# Patient Record
Sex: MALE | Race: WHITE | Employment: OTHER | URBAN - METROPOLITAN AREA
[De-identification: names, ages, dates, MRNs, and addresses within clinical notes are randomized per-mention and may not be internally consistent; named-entity substitution may affect disease eponyms.]

---

## 2022-09-11 ENCOUNTER — HOSPITAL ENCOUNTER (INPATIENT)
Age: 72
LOS: 3 days | Discharge: HOME HEALTH | DRG: 617 | End: 2022-09-14
Attending: STUDENT IN AN ORGANIZED HEALTH CARE EDUCATION/TRAINING PROGRAM | Admitting: HOSPITALIST
Payer: MEDICARE

## 2022-09-11 DIAGNOSIS — M86.9 OSTEOMYELITIS OF GREAT TOE OF RIGHT FOOT (HCC): Primary | ICD-10-CM

## 2022-09-11 PROBLEM — E11.9 CONTROLLED TYPE 2 DIABETES MELLITUS, WITH LONG-TERM CURRENT USE OF INSULIN (HCC): Status: ACTIVE | Noted: 2022-09-11

## 2022-09-11 PROBLEM — Z99.89 OSA ON CPAP: Status: ACTIVE | Noted: 2022-09-11

## 2022-09-11 PROBLEM — I48.91 A-FIB (HCC): Status: ACTIVE | Noted: 2022-09-11

## 2022-09-11 PROBLEM — Z79.4 CONTROLLED TYPE 2 DIABETES MELLITUS, WITH LONG-TERM CURRENT USE OF INSULIN (HCC): Status: ACTIVE | Noted: 2022-09-11

## 2022-09-11 PROBLEM — G47.33 OSA ON CPAP: Status: ACTIVE | Noted: 2022-09-11

## 2022-09-11 LAB
ALBUMIN SERPL-MCNC: 3.3 G/DL (ref 3.4–5)
ALBUMIN/GLOB SERPL: 0.9 {RATIO} (ref 0.8–1.7)
ALP SERPL-CCNC: 71 U/L (ref 45–117)
ALT SERPL-CCNC: 30 U/L (ref 16–61)
ANION GAP SERPL CALC-SCNC: 4 MMOL/L (ref 3–18)
AST SERPL-CCNC: 23 U/L (ref 10–38)
BASOPHILS # BLD: 0 K/UL (ref 0–0.1)
BASOPHILS NFR BLD: 1 % (ref 0–2)
BILIRUB SERPL-MCNC: 0.6 MG/DL (ref 0.2–1)
BUN SERPL-MCNC: 15 MG/DL (ref 7–18)
BUN/CREAT SERPL: 23 (ref 12–20)
CALCIUM SERPL-MCNC: 9 MG/DL (ref 8.5–10.1)
CHLORIDE SERPL-SCNC: 108 MMOL/L (ref 100–111)
CO2 SERPL-SCNC: 28 MMOL/L (ref 21–32)
CREAT SERPL-MCNC: 0.65 MG/DL (ref 0.6–1.3)
DIFFERENTIAL METHOD BLD: ABNORMAL
EOSINOPHIL # BLD: 0.2 K/UL (ref 0–0.4)
EOSINOPHIL NFR BLD: 4 % (ref 0–5)
ERYTHROCYTE [DISTWIDTH] IN BLOOD BY AUTOMATED COUNT: 14.5 % (ref 11.6–14.5)
EST. AVERAGE GLUCOSE BLD GHB EST-MCNC: 157 MG/DL
GLOBULIN SER CALC-MCNC: 3.7 G/DL (ref 2–4)
GLUCOSE BLD STRIP.AUTO-MCNC: 100 MG/DL (ref 70–110)
GLUCOSE BLD STRIP.AUTO-MCNC: 63 MG/DL (ref 70–110)
GLUCOSE BLD STRIP.AUTO-MCNC: 89 MG/DL (ref 70–110)
GLUCOSE SERPL-MCNC: 79 MG/DL (ref 74–99)
HBA1C MFR BLD: 7.1 % (ref 4.2–5.6)
HCT VFR BLD AUTO: 39.7 % (ref 36–48)
HGB BLD-MCNC: 12.9 G/DL (ref 13–16)
IMM GRANULOCYTES # BLD AUTO: 0 K/UL (ref 0–0.04)
IMM GRANULOCYTES NFR BLD AUTO: 1 % (ref 0–0.5)
INR PPP: 2.3 (ref 0.8–1.2)
LYMPHOCYTES # BLD: 0.9 K/UL (ref 0.9–3.6)
LYMPHOCYTES NFR BLD: 14 % (ref 21–52)
MCH RBC QN AUTO: 32.3 PG (ref 24–34)
MCHC RBC AUTO-ENTMCNC: 32.5 G/DL (ref 31–37)
MCV RBC AUTO: 99.3 FL (ref 78–100)
MONOCYTES # BLD: 0.8 K/UL (ref 0.05–1.2)
MONOCYTES NFR BLD: 13 % (ref 3–10)
NEUTS SEG # BLD: 4.5 K/UL (ref 1.8–8)
NEUTS SEG NFR BLD: 69 % (ref 40–73)
NRBC # BLD: 0 K/UL (ref 0–0.01)
NRBC BLD-RTO: 0 PER 100 WBC
PLATELET # BLD AUTO: 218 K/UL (ref 135–420)
PMV BLD AUTO: 9.1 FL (ref 9.2–11.8)
POTASSIUM SERPL-SCNC: 4.5 MMOL/L (ref 3.5–5.5)
PROT SERPL-MCNC: 7 G/DL (ref 6.4–8.2)
PROTHROMBIN TIME: 25.5 SEC (ref 11.5–15.2)
RBC # BLD AUTO: 4 M/UL (ref 4.35–5.65)
SODIUM SERPL-SCNC: 140 MMOL/L (ref 136–145)
WBC # BLD AUTO: 6.5 K/UL (ref 4.6–13.2)

## 2022-09-11 PROCEDURE — 87040 BLOOD CULTURE FOR BACTERIA: CPT

## 2022-09-11 PROCEDURE — 85025 COMPLETE CBC W/AUTO DIFF WBC: CPT

## 2022-09-11 PROCEDURE — 74011250637 HC RX REV CODE- 250/637: Performed by: HOSPITALIST

## 2022-09-11 PROCEDURE — 85610 PROTHROMBIN TIME: CPT

## 2022-09-11 PROCEDURE — 74011000258 HC RX REV CODE- 258: Performed by: PHYSICIAN ASSISTANT

## 2022-09-11 PROCEDURE — 74011250636 HC RX REV CODE- 250/636: Performed by: PHYSICIAN ASSISTANT

## 2022-09-11 PROCEDURE — 83036 HEMOGLOBIN GLYCOSYLATED A1C: CPT

## 2022-09-11 PROCEDURE — 96374 THER/PROPH/DIAG INJ IV PUSH: CPT

## 2022-09-11 PROCEDURE — 65270000029 HC RM PRIVATE

## 2022-09-11 PROCEDURE — 80053 COMPREHEN METABOLIC PANEL: CPT

## 2022-09-11 PROCEDURE — 82962 GLUCOSE BLOOD TEST: CPT

## 2022-09-11 PROCEDURE — 93005 ELECTROCARDIOGRAM TRACING: CPT

## 2022-09-11 PROCEDURE — 99285 EMERGENCY DEPT VISIT HI MDM: CPT

## 2022-09-11 PROCEDURE — 74011000250 HC RX REV CODE- 250: Performed by: HOSPITALIST

## 2022-09-11 RX ORDER — OXYCODONE AND ACETAMINOPHEN 5; 325 MG/1; MG/1
1 TABLET ORAL
Status: DISCONTINUED | OUTPATIENT
Start: 2022-09-11 | End: 2022-09-14 | Stop reason: HOSPADM

## 2022-09-11 RX ORDER — MONTELUKAST SODIUM 10 MG/1
10 TABLET ORAL
COMMUNITY

## 2022-09-11 RX ORDER — SPIRONOLACTONE 25 MG/1
25 TABLET ORAL DAILY
Status: DISCONTINUED | OUTPATIENT
Start: 2022-09-12 | End: 2022-09-11

## 2022-09-11 RX ORDER — INSULIN GLARGINE 100 [IU]/ML
50 INJECTION, SOLUTION SUBCUTANEOUS
Status: DISCONTINUED | OUTPATIENT
Start: 2022-09-11 | End: 2022-09-13

## 2022-09-11 RX ORDER — SPIRONOLACTONE 25 MG/1
12.5 TABLET ORAL DAILY
Status: DISCONTINUED | OUTPATIENT
Start: 2022-09-12 | End: 2022-09-14 | Stop reason: HOSPADM

## 2022-09-11 RX ORDER — IPRATROPIUM BROMIDE 21 UG/1
2 SPRAY, METERED NASAL 3 TIMES DAILY
COMMUNITY

## 2022-09-11 RX ORDER — ACETAMINOPHEN 325 MG/1
650 TABLET ORAL
Status: DISCONTINUED | OUTPATIENT
Start: 2022-09-11 | End: 2022-09-14 | Stop reason: HOSPADM

## 2022-09-11 RX ORDER — ATORVASTATIN CALCIUM 40 MG/1
40 TABLET, FILM COATED ORAL
COMMUNITY

## 2022-09-11 RX ORDER — SPIRONOLACTONE 25 MG/1
25 TABLET ORAL DAILY
COMMUNITY

## 2022-09-11 RX ORDER — MONTELUKAST SODIUM 10 MG/1
10 TABLET ORAL
Status: DISCONTINUED | OUTPATIENT
Start: 2022-09-11 | End: 2022-09-14 | Stop reason: HOSPADM

## 2022-09-11 RX ORDER — MAGNESIUM SULFATE 100 %
4 CRYSTALS MISCELLANEOUS AS NEEDED
Status: DISCONTINUED | OUTPATIENT
Start: 2022-09-11 | End: 2022-09-14 | Stop reason: HOSPADM

## 2022-09-11 RX ORDER — VANCOMYCIN 2 GRAM/500 ML IN 0.9 % SODIUM CHLORIDE INTRAVENOUS
2000 ONCE
Status: COMPLETED | OUTPATIENT
Start: 2022-09-11 | End: 2022-09-12

## 2022-09-11 RX ORDER — ONDANSETRON 2 MG/ML
4 INJECTION INTRAMUSCULAR; INTRAVENOUS
Status: DISCONTINUED | OUTPATIENT
Start: 2022-09-11 | End: 2022-09-14 | Stop reason: HOSPADM

## 2022-09-11 RX ORDER — SODIUM CHLORIDE 0.9 % (FLUSH) 0.9 %
5-40 SYRINGE (ML) INJECTION AS NEEDED
Status: DISCONTINUED | OUTPATIENT
Start: 2022-09-11 | End: 2022-09-14 | Stop reason: HOSPADM

## 2022-09-11 RX ORDER — ATORVASTATIN CALCIUM 20 MG/1
40 TABLET, FILM COATED ORAL
Status: DISCONTINUED | OUTPATIENT
Start: 2022-09-11 | End: 2022-09-14 | Stop reason: HOSPADM

## 2022-09-11 RX ORDER — DEXTROSE MONOHYDRATE 100 MG/ML
0-250 INJECTION, SOLUTION INTRAVENOUS AS NEEDED
Status: DISCONTINUED | OUTPATIENT
Start: 2022-09-11 | End: 2022-09-14 | Stop reason: HOSPADM

## 2022-09-11 RX ORDER — VANCOMYCIN HYDROCHLORIDE
1250 EVERY 12 HOURS
Status: DISCONTINUED | OUTPATIENT
Start: 2022-09-12 | End: 2022-09-13

## 2022-09-11 RX ORDER — AMOXICILLIN AND CLAVULANATE POTASSIUM 875; 125 MG/1; MG/1
TABLET, FILM COATED ORAL 2 TIMES DAILY
COMMUNITY
End: 2022-09-14

## 2022-09-11 RX ORDER — SODIUM CHLORIDE 0.9 % (FLUSH) 0.9 %
5-40 SYRINGE (ML) INJECTION EVERY 8 HOURS
Status: DISCONTINUED | OUTPATIENT
Start: 2022-09-11 | End: 2022-09-14 | Stop reason: HOSPADM

## 2022-09-11 RX ORDER — WARFARIN SODIUM 5 MG/1
5 TABLET ORAL DAILY
COMMUNITY

## 2022-09-11 RX ORDER — PROMETHAZINE HYDROCHLORIDE 25 MG/1
12.5 TABLET ORAL
Status: DISCONTINUED | OUTPATIENT
Start: 2022-09-11 | End: 2022-09-14 | Stop reason: HOSPADM

## 2022-09-11 RX ORDER — LOSARTAN POTASSIUM 50 MG/1
50 TABLET ORAL DAILY
COMMUNITY

## 2022-09-11 RX ORDER — ACETAMINOPHEN 650 MG/1
650 SUPPOSITORY RECTAL
Status: DISCONTINUED | OUTPATIENT
Start: 2022-09-11 | End: 2022-09-14 | Stop reason: HOSPADM

## 2022-09-11 RX ORDER — FACIAL-BODY WIPES
10 EACH TOPICAL DAILY PRN
Status: DISCONTINUED | OUTPATIENT
Start: 2022-09-11 | End: 2022-09-14 | Stop reason: HOSPADM

## 2022-09-11 RX ORDER — INSULIN LISPRO 100 [IU]/ML
INJECTION, SOLUTION INTRAVENOUS; SUBCUTANEOUS
Status: DISCONTINUED | OUTPATIENT
Start: 2022-09-11 | End: 2022-09-14 | Stop reason: HOSPADM

## 2022-09-11 RX ORDER — MELOXICAM 7.5 MG/1
7.5 TABLET ORAL
COMMUNITY
End: 2022-09-14

## 2022-09-11 RX ORDER — METFORMIN HYDROCHLORIDE 750 MG/1
750 TABLET, EXTENDED RELEASE ORAL
COMMUNITY

## 2022-09-11 RX ORDER — LOSARTAN POTASSIUM 50 MG/1
50 TABLET ORAL DAILY
Status: DISCONTINUED | OUTPATIENT
Start: 2022-09-12 | End: 2022-09-14 | Stop reason: HOSPADM

## 2022-09-11 RX ORDER — VANCOMYCIN HYDROCHLORIDE
1250 EVERY 24 HOURS
Status: DISCONTINUED | OUTPATIENT
Start: 2022-09-12 | End: 2022-09-11

## 2022-09-11 RX ADMIN — CEFEPIME 2 G: 2 INJECTION, POWDER, FOR SOLUTION INTRAVENOUS at 22:30

## 2022-09-11 RX ADMIN — MONTELUKAST 10 MG: 10 TABLET, FILM COATED ORAL at 19:59

## 2022-09-11 RX ADMIN — VANCOMYCIN HYDROCHLORIDE 2000 MG: 10 INJECTION, POWDER, LYOPHILIZED, FOR SOLUTION INTRAVENOUS at 17:23

## 2022-09-11 RX ADMIN — CEFEPIME 2 G: 2 INJECTION, POWDER, FOR SOLUTION INTRAVENOUS at 14:05

## 2022-09-11 RX ADMIN — SODIUM CHLORIDE, PRESERVATIVE FREE 10 ML: 5 INJECTION INTRAVENOUS at 17:24

## 2022-09-11 RX ADMIN — ATORVASTATIN CALCIUM 40 MG: 20 TABLET, FILM COATED ORAL at 19:59

## 2022-09-11 NOTE — H&P
History & Physical    Patient: Doug Ascencio MRN: [id number]  CSN: [id number]    YOB: 1950  Age: 67 y.o. Sex: male      DOA: 9/11/2022  Primary Care Provider:  Ananya Olivas DO      Assessment/Plan     Hospital Problems  Never Reviewed            Codes Class Noted POA    * (Principal) Osteomyelitis of great toe of right foot ([facility] [address] ICD-10-CM: M86.9  ICD-9-CM: 730.27  9/11/2022 Unknown        A-fib ([facility] [address] ICD-10-CM: I48.91  ICD-9-CM: 427.31  9/11/2022 Unknown        Controlled type 2 diabetes mellitus, with long-term current use of insulin (HCC) ICD-10-CM: E11.9, Z79.4  ICD-9-CM: 250.00, V58.67  9/11/2022 Unknown        KOLE on CPAP ICD-10-CM: G47.33, Z99.89  ICD-9-CM: 327.23, V46.8  9/11/2022 Unknown             Admit to remote telemetry    Osteomyelitis of left toe  Local wound care  Case discussed with Dr. Kyree Mcfarlane planning to do surgery tomorrow  Will have wound culture  ID consult  Continue IV antibiotics    A. fib on warfarin  Continue monitoring,   Cardiology consult for clearance. EKG, troponin, echo  Discussed with Dr. Anastasia Galeazzi warfarin for now for the procedure  Daily INR    KOLE  On CPAP we will continue    Hypertension  Continue home medication      DM type II , with complication,  Put Lantus, 50 use 7030 60/70 twice a day,  Ssi also         Full code     Please note that this dictation was completed with [employer], the [employer] voice recognition software. Quite often unanticipated grammatical, syntax, homophones, and other interpretive errors are inadvertently transcribed by the computer software. Please disregard these errors. Please excuse any errors that have escaped final proofreading    Estimate  length of stay : 2-3 day    DVT : heparin ppi proph  CC: foot pain        HPI:     Doug Ascencio is a 67 y.o. male with hypertension, A. fib on warfarin,  DM, kole on cpap was sent to ER per Dr. Kyree Mcfarlane podiatrist due to left toe OM. He has left toe lesion for 2 to 3 weeks.   He was giving p.o. antibiotics, his foot swelling did not got improving. He was sent to see Dr. Jersey Mendenhall. He got  recent MRI done last Friday, he was called to go to ER for further evaluation and treatment because of osteomyelitis of the toe from MRI report. Denies any slurred speech/headache/cp/n/v/blurred vission/d/c/palpitation/gait change/bleeding. Denies smoking/ any alcohol or drug use. He uses cpap at night       Visit Vitals  BP (!) 147/80   Pulse 78   Temp 98.6 °F (37 °C)   Resp 18   Ht 5' 8\" (1.727 m)   Wt 122.5 kg (270 lb)   SpO2 98%   BMI 41.05 kg/m²         Past medical history  Hypertension    Surgical social history  None  Family history  Hypertension diabetes parents    Social History     Socioeconomic History    Marital status:        Prior to Admission medications    Medication Sig Start Date End Date Taking? Authorizing Provider   amoxicillin-clavulanate (AUGMENTIN) 875-125 mg per tablet Take  by mouth two (2) times a day. Yes Provider, Historical   atorvastatin (LIPITOR) 40 mg tablet Take 40 mg by mouth daily (after dinner). Yes Provider, Historical   ipratropium (ATROVENT) 21 mcg (0.03 %) nasal spray 2 Sprays three (3) times daily. Yes Provider, Historical   losartan (COZAAR) 50 mg tablet Take 50 mg by mouth daily. Yes Provider, Historical   meloxicam (MOBIC) 7.5 mg tablet Take 7.5 mg by mouth daily as needed for Pain. For pain   Yes Provider, Historical   metFORMIN ER (GLUCOPHAGE XR) 750 mg tablet Take 750 mg by mouth Daily (before dinner). Two tablets   Yes Provider, Historical   montelukast (SINGULAIR) 10 mg tablet Take 10 mg by mouth daily (after dinner). Yes Provider, Historical   spironolactone (ALDACTONE) 25 mg tablet Take 25 mg by mouth daily. Take one half tablet once daily   Yes Provider, Historical   warfarin (COUMADIN) 5 mg tablet Take 5 mg by mouth daily.  Take one and 1/2 tablets every day per INR   Yes Provider, Historical       Allergies   Allergen Reactions    Sulfa (Sulfonamide Antibiotics) Unknown (comments)       Review of Systems  Gen: No fever, chills, malaise, weight loss/gain. Heent: No headache, rhinorrhea, epistaxis, ear pain, hearing loss, sinus pain, neck pain/stiffness, sore throat. Heart: No chest pain, palpitations, OCHOA, pnd, or orthopnea. Resp: No cough, hemoptysis, wheezing and shortness of breath. GI: No nausea, vomiting, diarrhea, constipation, melena or hematochezia. : No urinary obstruction, dysuria or hematuria. Derm: No rash, new skin lesion or pruritis. Musc/skeletal: pain in toes   Vasc: No edema, cyanosis or claudication. Endo: No heat/cold intolerance, no polyuria,polydipsia or polyphagia. Neuro: No unilateral weakness, numbness, tingling. No seizures. Heme: No easy bruising or bleeding. Physical Exam:     Physical Exam:  Visit Vitals  BP (!) 147/80   Pulse 78   Temp 98.6 °F (37 °C)   Resp 18   Ht 5' 8\" (1.727 m)   Wt 122.5 kg (270 lb)   SpO2 98%   BMI 41.05 kg/m²           Temp (24hrs), Av.2 °F (36.8 °C), Min:97.8 °F (36.6 °C), Max:98.6 °F (37 °C)    No intake/output data recorded. No intake/output data recorded. General:  Awake, cooperative, no distress. Head:  Normocephalic, without obvious abnormality, atraumatic. Eyes:  Conjunctivae/corneas clear, sclera anicteric, PERRL, EOMs intact. Nose: Nares normal. No drainage or sinus tenderness. Throat: Lips, mucosa, and tongue normal. .   Neck: Supple, symmetrical, trachea midline, no adenopathy. Lungs:   Clear to auscultation bilaterally. Heart:  Regular rate and rhythm, S1, S2 normal, no murmur, click, rub or gallop. Abdomen: Soft, non-tender. Bowel sounds normal. No masses,  No organomegaly. Extremities: Extremities normal, see the picture, no cyanosis or edema. Pulses: 2+ and symmetric all extremities. Skin: Skin color-pink, texture, turgor normal. See below  Capillary refill normal    Neurologic: CNII-XII intact.  No focal motor or sensor      y deficit. Labs Reviewed:  Recent Results (from the past 12 hour(s))   CBC WITH AUTOMATED DIFF    Collection Time: 09/11/22  1:45 PM   Result Value Ref Range    WBC 6.5 4.6 - 13.2 K/uL    RBC 4.00 (L) 4.35 - 5.65 M/uL    HGB 12.9 (L) 13.0 - 16.0 g/dL    HCT 39.7 36.0 - 48.0 %    MCV 99.3 78.0 - 100.0 FL    MCH 32.3 24.0 - 34.0 PG    MCHC 32.5 31.0 - 37.0 g/dL    RDW 14.5 11.6 - 14.5 %    PLATELET 478 074 - 825 K/uL    MPV 9.1 (L) 9.2 - 11.8 FL    NRBC 0.0 0  WBC    ABSOLUTE NRBC 0.00 0.00 - 0.01 K/uL    NEUTROPHILS 69 40 - 73 %    LYMPHOCYTES 14 (L) 21 - 52 %    MONOCYTES 13 (H) 3 - 10 %    EOSINOPHILS 4 0 - 5 %    BASOPHILS 1 0 - 2 %    IMMATURE GRANULOCYTES 1 (H) 0.0 - 0.5 %    ABS. NEUTROPHILS 4.5 1.8 - 8.0 K/UL    ABS. LYMPHOCYTES 0.9 0.9 - 3.6 K/UL    ABS. MONOCYTES 0.8 0.05 - 1.2 K/UL    ABS. EOSINOPHILS 0.2 0.0 - 0.4 K/UL    ABS. BASOPHILS 0.0 0.0 - 0.1 K/UL    ABS. IMM. GRANS. 0.0 0.00 - 0.04 K/UL    DF AUTOMATED     METABOLIC PANEL, COMPREHENSIVE    Collection Time: 09/11/22  1:45 PM   Result Value Ref Range    Sodium 140 136 - 145 mmol/L    Potassium 4.5 3.5 - 5.5 mmol/L    Chloride 108 100 - 111 mmol/L    CO2 28 21 - 32 mmol/L    Anion gap 4 3.0 - 18 mmol/L    Glucose 79 74 - 99 mg/dL    BUN 15 7.0 - 18 MG/DL    Creatinine 0.65 0.6 - 1.3 MG/DL    BUN/Creatinine ratio 23 (H) 12 - 20      GFR est AA >60 >60 ml/min/1.73m2    GFR est non-AA >60 >60 ml/min/1.73m2    Calcium 9.0 8.5 - 10.1 MG/DL    Bilirubin, total 0.6 0.2 - 1.0 MG/DL    ALT (SGPT) 30 16 - 61 U/L    AST (SGOT) 23 10 - 38 U/L    Alk.  phosphatase 71 45 - 117 U/L    Protein, total 7.0 6.4 - 8.2 g/dL    Albumin 3.3 (L) 3.4 - 5.0 g/dL    Globulin 3.7 2.0 - 4.0 g/dL    A-G Ratio 0.9 0.8 - 1.7     PROTHROMBIN TIME + INR    Collection Time: 09/11/22  1:45 PM   Result Value Ref Range    Prothrombin time 25.5 (H) 11.5 - 15.2 sec    INR 2.3 (H) 0.8 - 1.2     GLUCOSE, POC    Collection Time: 09/11/22  3:39 PM   Result Value Ref Range    Glucose (POC) 63 (L) 70 - 110 mg/dL   GLUCOSE, POC    Collection Time: 09/11/22  4:25 PM   Result Value Ref Range    Glucose (POC) 89 70 - 110 mg/dL       No results found.   Procedures/imaging: see electronic medical records for all procedures/Xrays and details which were not copied into this note but were reviewed prior to creation of Shubham Byrd MD, Internal Medicine     CC: Clint Mansfield DO

## 2022-09-11 NOTE — ROUTINE PROCESS
TRANSFER - OUT REPORT:    Verbal report given to medical(name) on Aby Ceballos  being transferred to [facility] medical(unit) for routine progression of care       Report consisted of patients Situation, Background, Assessment and   Recommendations(SBAR). Information from the following report(s) SBAR and ED Summary was reviewed with the receiving nurse. Lines:   Peripheral IV 09/11/22 Right Forearm (Active)   Site Assessment Clean, dry, & intact 09/11/22 1340   Hub Color/Line Status Pink 09/11/22 1340        Opportunity for questions and clarification was provided.       Patient transported with:   [employer]

## 2022-09-11 NOTE — ED TRIAGE NOTES
Pt presents from Dr. Severiano Rowland for R great toe infection. Denies fever, chills, NVD.  Currently taking Augmentin, finished Bactrim

## 2022-09-11 NOTE — Clinical Note
Status[de-identified] INPATIENT [101]   Type of Bed: Medical [8]   Cardiac Monitoring Required?: No   Inpatient Hospitalization Certified Necessary for the Following Reasons: 3.  Patient receiving treatment that can only be provided in an inpatient setting (further clarification in H&P documentation)   Admitting Diagnosis: Osteomyelitis of great toe of right foot [facility]) [[id number]]   Admitting Physician: Anyi Villaseñor [[id number]]   Attending Physician: Anyi Villaseñor [[id number]]   Estimated Length of Stay: 2 Midnights   Discharge Plan[de-identified] Home with Office Follow-up

## 2022-09-11 NOTE — PROGRESS NOTES
[address] [facility] Pharmacokinetic Monitoring Service - Vancomycin     Sapna Cuevas is a 67 y.o. male starting on vancomycin therapy for Bone and Joint infection. Pharmacy consulted by Michael SIMS  for monitoring and adjustment. Target Concentration: Goal AUC/DEDE 400-600 mg*hr/L    Additional Antimicrobials: Cefepime     Pertinent Laboratory Values: Wt Readings from Last 1 Encounters:   09/11/22 122.5 kg (270 lb)     Temp Readings from Last 1 Encounters:   09/11/22 97.8 °F (36.6 °C)     No components found for: PROCAL  Estimated Creatinine Clearance: 121.4 mL/min (by C-G formula based on SCr of 0.65 mg/dL).   Recent Labs     09/11/22  1345   WBC 6.5     Plan:  Dosing recommendations based on Bayesian software  Start Vancomycin 2 gm IVPB x 1 LD then Vancomycin 1250 mg IVPB q12h   Anticipated AUC of 513 mg/l.h and trough concentration of 13.9 mg/l at steady state  Renal labs as indicated   Pharmacy will continue to monitor patient and adjust therapy as indicated    MIGUEL ANGEL Arizmendi [facility], BCPS   9/11/2022 2:51 PM

## 2022-09-11 NOTE — ED PROVIDER NOTES
EMERGENCY DEPARTMENT HISTORY AND PHYSICAL EXAM    Date: 9/11/2022  Patient Name: Eyd Lopes    History of Presenting Illness     Chief Complaint   Patient presents with    Toe Pain         History Provided By: Patient    2:05 PM  Shruti Chaudhari is a 67 y.o. male with PMHX of hypertension, diabetes, atrial fibrillation on Coumadin, who presents to the emergency department C/O osteomyelitis of right great toe. Patient states he developed right great toe pain redness and swelling a few weeks ago, saw urgent care was started on antibiotic. He then saw his PCP as it worsened who immediately sent him to a podiatrist Dr. Oniel Acosta. He states he is taking 2 unknown antibiotics currently, had outpatient MRI of the right foot 2 days ago and was called by the podiatrist yesterday to go to the ER for admission as he had evidence of osteomyelitis of his right great toe. Pt denies fever, injury, and any other sxs or complaints. PCP: Flash Hernandez DO    Current Facility-Administered Medications   Medication Dose Route Frequency Provider Last Rate Last Admin    cefepime (MAXIPIME) 2 g in 0.9% sodium chloride (MBP/ADV) 100 mL MBP  2 g IntraVENous Q8H LEVI Rodriguez 200 mL/hr at 09/11/22 1405 2 g at 09/11/22 1405    vancomycin (VANCOCIN) 2000 mg in  ml infusion  2,000 mg IntraVENous ONCE LEVI Rodriguez           Past History     Past Medical History:  No past medical history on file. Past Surgical History:  No past surgical history on file. Family History:  No family history on file. Social History: Allergies: Allergies   Allergen Reactions    Sulfa (Sulfonamide Antibiotics) Unknown (comments)         Review of Systems   Review of Systems   Constitutional:  Negative for fever. Musculoskeletal:  Positive for arthralgias, joint swelling and myalgias. Skin:  Positive for color change. Neurological:  Negative for numbness. All other systems reviewed and are negative.       Physical Exam Vitals:    09/11/22 1219   BP: (!) 146/85   Pulse: 65   Resp: 15   Temp: 97.8 °F (36.6 °C)   SpO2: 97%   Weight: 122.5 kg (270 lb)   Height: 5' 8\" (1.727 m)     Physical Exam  Vital signs and nursing notes reviewed. CONSTITUTIONAL: Alert. Well-appearing; obese, in no apparent distress. HEAD: Normocephalic; atraumatic. CV: Normal S1, S2; no murmurs, rubs, or gallops. RESPIRATORY: Normal chest excursion with respiration; breath sounds clear and equal bilaterally; no wheezes, rhonchi, or rales. EXT: RLE: Right great toe is diffusely swollen with circumferential erythema to the base of the toe, superficial ulceration to the medial aspect of his distal great toe and a small superficial laceration to the base of his toe that had scant bleeding that was easily controlled with pressure. Forefoot is slightly swollen as well. Cap refill less than 2 seconds. 2+ DP pulse. SKIN: Normal for age and race; warm; dry; good turgor; no apparent lesions or exudate. NEURO: A & O x3. PSYCH:  Mood and affect appropriate. Diagnostic Study Results     Labs -     Recent Results (from the past 12 hour(s))   CBC WITH AUTOMATED DIFF    Collection Time: 09/11/22  1:45 PM   Result Value Ref Range    WBC 6.5 4.6 - 13.2 K/uL    RBC 4.00 (L) 4.35 - 5.65 M/uL    HGB 12.9 (L) 13.0 - 16.0 g/dL    HCT 39.7 36.0 - 48.0 %    MCV 99.3 78.0 - 100.0 FL    MCH 32.3 24.0 - 34.0 PG    MCHC 32.5 31.0 - 37.0 g/dL    RDW 14.5 11.6 - 14.5 %    PLATELET 490 447 - 818 K/uL    MPV 9.1 (L) 9.2 - 11.8 FL    NRBC 0.0 0  WBC    ABSOLUTE NRBC 0.00 0.00 - 0.01 K/uL    NEUTROPHILS 69 40 - 73 %    LYMPHOCYTES 14 (L) 21 - 52 %    MONOCYTES 13 (H) 3 - 10 %    EOSINOPHILS 4 0 - 5 %    BASOPHILS 1 0 - 2 %    IMMATURE GRANULOCYTES 1 (H) 0.0 - 0.5 %    ABS. NEUTROPHILS 4.5 1.8 - 8.0 K/UL    ABS. LYMPHOCYTES 0.9 0.9 - 3.6 K/UL    ABS. MONOCYTES 0.8 0.05 - 1.2 K/UL    ABS. EOSINOPHILS 0.2 0.0 - 0.4 K/UL    ABS.  BASOPHILS 0.0 0.0 - 0.1 K/UL ABS. IMM. GRANS. 0.0 0.00 - 0.04 K/UL    DF AUTOMATED     PROTHROMBIN TIME + INR    Collection Time: 09/11/22  1:45 PM   Result Value Ref Range    Prothrombin time 25.5 (H) 11.5 - 15.2 sec    INR 2.3 (H) 0.8 - 1.2         Radiologic Studies -   No orders to display     CT Results  (Last 48 hours)      None          CXR Results  (Last 48 hours)      None            Medications given in the ED-  Medications   cefepime (MAXIPIME) 2 g in 0.9% sodium chloride (MBP/ADV) 100 mL MBP (2 g IntraVENous New Bag 9/11/22 1405)   vancomycin (VANCOCIN) 2000 mg in  ml infusion (has no administration in time range)         Medical Decision Making   I am the first provider for this patient. I reviewed the vital signs, available nursing notes, past medical history, past surgical history, family history and social history. Vital Signs-Reviewed the patient's vital signs. Records Reviewed: Nursing Notes      Procedures:  Procedures    ED Course:  2:05 PM   Initial assessment performed. The patients presenting problems have been discussed, and they are in agreement with the care plan formulated and outlined with them. I have encouraged them to ask questions as they arise throughout their visit. 2:15 PM consult note  Case discussed with hospitalist Dr. Marko Dennis who will admit to medical floor. Provider Notes (Medical Decision Making): Jacquelyn Lyon is a 67 y.o. male with hypertension, A. fib on Coumadin and diabetes presents sent by podiatrist for osteomyelitis of the right great toe on outpatient MRI 2 days ago. He has obvious cellulitis and a superficial open wound to his right great toe that will require admission for IV antibiotics. Blood cultures collected, vancomycin and cefepime started. 2:36 PM consult note  Case discussed with podiatrist on-call for Dr. Moose Graham, Dr. Radha Yan.   He agrees with admission and IV antibiotics but requests that the hospitalist keep patient n.p.o. after midnight and call Dr. José Miguel Chapin tomorrow for further management. Diagnosis and Disposition     ADMISSION NOTE:  2:09 PM  Patient is being admitted to the hospital by Dr. Christiano Manzano, . The results of their tests and reasons for their admission have been discussed with them and/or available family. They convey agreement and understanding for the need to be admitted and for their admission diagnosis. CONDITIONS ON ADMISSION:  Deep Vein Thrombosis is not present at the time of admission. Thrombosis is not present at the time of admission. Urinary Tract Infection is not present at the time of admission. Pneumonia is not present at the time of admission. MRSA possiblet the time of admission. Wound infection is present at the time of admission. Pressure Ulcer is not present at the time of admission. CLINICAL IMPRESSION:    1. Osteomyelitis of great toe of right foot ([address]        PLAN:  1. Admit        _______________________________      Please note that this dictation was completed with [employer], the computer voice recognition software. Quite often unanticipated grammatical, syntax, homophones, and other interpretive errors are inadvertently transcribed by the computer software. Please disregard these errors. Please excuse any errors that have escaped final proofreading.

## 2022-09-11 NOTE — CONSULTS
[facility] Consult Note      Patient: Anna Hui MRN: [id number]  SSN: [id number]    YOB: 1950  Age: 67 y.o. Sex: male    Date of Consultation: 9/11/2022  Referring Physician: Dr. Demetri Ling  Reason for Consultation: Atrial fibrillation and preoperative cardiac clearance foot surgery. HPI:  I was asked by Dr. Demetri Ling  To see this patient for preoperative cardiac clearance for foot surgery and chronic atrial fibrillation. Anna Parent is a 77-year-old gentleman with history of chronic atrial fibrillation on anticoagulation with the warfarin and on rate control medication with carvedilol, Aldactone and S losartan. Patient also have history of diabetes mellitus and hypertension and obesity. Patient denied any history of DVT, pulmonary embolism TIA or stroke. Patient is mainly admitted in the hospital with right foot pain and being scheduled for possible podiatry surgery          No past medical history on file. No past surgical history on file.   Current Facility-Administered Medications   Medication Dose Route Frequency    cefepime (MAXIPIME) 2 g in 0.9% sodium chloride (MBP/ADV) 100 mL MBP  2 g IntraVENous Q8H    vancomycin (VANCOCIN) 2000 mg in  ml infusion  2,000 mg IntraVENous ONCE    Vancomycin Pharmacy Dosing   1 Each Other Rx Dosing/Monitoring    sodium chloride (NS) flush 5-40 mL  5-40 mL IntraVENous Q8H    sodium chloride (NS) flush 5-40 mL  5-40 mL IntraVENous PRN    acetaminophen (TYLENOL) tablet 650 mg  650 mg Oral Q6H PRN    Or    acetaminophen (TYLENOL) suppository 650 mg  650 mg Rectal Q6H PRN    bisacodyL (DULCOLAX) suppository 10 mg  10 mg Rectal DAILY PRN    promethazine (PHENERGAN) tablet 12.5 mg  12.5 mg Oral Q6H PRN    Or    ondansetron (ZOFRAN) injection 4 mg  4 mg IntraVENous Q6H PRN    insulin lispro (HUMALOG) injection   SubCUTAneous AC&HS    glucose chewable tablet 16 g  4 Tablet Oral PRN    glucagon (GLUCAGEN) injection 1 mg  1 mg IntraMUSCular PRN    dextrose 10% infusion 0-250 mL  0-250 mL IntraVENous PRN    insulin glargine (LANTUS) injection 50 Units  50 Units SubCUTAneous QHS    atorvastatin (LIPITOR) tablet 40 mg  40 mg Oral PCD    [START ON 9/12/2022] losartan (COZAAR) tablet 50 mg  50 mg Oral DAILY    montelukast (SINGULAIR) tablet 10 mg  10 mg Oral PCD    [START ON 9/12/2022] spironolactone (ALDACTONE) tablet 12.5 mg  12.5 mg Oral DAILY    [START ON 9/12/2022] vancomycin (VANCOCIN) 1250 mg in  ml infusion  1,250 mg IntraVENous Q12H       Allergies and Intolerances: Allergies   Allergen Reactions    Sulfa (Sulfonamide Antibiotics) Unknown (comments)       Family History:   No family history on file. Social History:   He  has no history on file for tobacco use. He  has no history on file for alcohol use. Review of Systems:     Review of Systems  Gen: No fever, chills, malaise, weight loss/gain. Heent: No headache, rhinorrhea, epistaxis, ear pain, hearing loss, sinus pain, neck pain/stiffness, sore throat. Heart: No chest pain, palpitations, OCHOA, pnd, or orthopnea. Resp: No cough, hemoptysis, wheezing and shortness of breath. GI: No nausea, vomiting, diarrhea, constipation, melena or hematochezia. : No urinary obstruction, dysuria or hematuria. Derm: No rash, new skin lesion or pruritis. Musc/skeletal: no bone or joint complains. Right toe pain  Vasc: No edema, cyanosis or claudication. Endo: No heat/cold intolerance, no polyuria,polydipsia or polyphagia. Neuro: No unilateral weakness, numbness, tingling. No seizures. Heme: No easy bruising or bleeding. Physical:   Patient Vitals for the past 6 hrs:   Temp Pulse Resp BP SpO2   09/11/22 1626 -- -- -- (!) 147/80 --   09/11/22 1507 98.6 °F (37 °C) 78 18 (!) 151/86 98 %         Exam:   General Appearance: Comfortable, not using accessory muscles of respiration. HEENT: REEMA. HEAD: Atraumatic  NECK: No JVD, no thyroidomeglay. LUNGS: Clear bilaterally.    HEART: S1 irregular, variable +S2     ABD: Non-tender, BS Audible    EXT: No edema, and no cysnosis. VASCULAR EXAM:  pulses are intact in the upper extremities    PSYCHIATRIC EXAM: Mood is appropriate. MUSCULOSKELETAL: Grossly no joint deformity. NEUROLOGICAL: alert, oriented in time place and person    Review of Data:   LABS:   Lab Results   Component Value Date/Time    WBC 6.5 09/11/2022 01:45 PM    HGB 12.9 (L) 09/11/2022 01:45 PM    HCT 39.7 09/11/2022 01:45 PM    PLATELET 832 58/54/6593 01:45 PM     Lab Results   Component Value Date/Time    Sodium 140 09/11/2022 01:45 PM    Potassium 4.5 09/11/2022 01:45 PM    Chloride 108 09/11/2022 01:45 PM    CO2 28 09/11/2022 01:45 PM    Glucose 79 09/11/2022 01:45 PM    BUN 15 09/11/2022 01:45 PM    Creatinine 0.65 09/11/2022 01:45 PM     No results found for: CHOL, CHOLX, CHLST, CHOLV, HDL, HDLP, LDL, LDLC, DLDLP, TGLX, TRIGL, TRIGP  No components found for: GPT  No results found for: HBA1C, QRI0DWBF, RKL9QWER    RADIOLOGY:  CT Results  (Last 48 hours)      None          CXR Results  (Last 48 hours)      None              Cardiology Procedures: No results found for this or any previous visit. Echo Results  (Last 48 hours)      None         Cardiolite (Tc-99m Sestamibi) stress test        Impression / Plan:    Patient Active Problem List   Diagnosis Code    Osteomyelitis of great toe of right foot ([facility]) M86.9    A-fib ([facility]) I48.91    Controlled type 2 diabetes mellitus, with long-term current use of insulin ([facility]) E11.9, Z79.4    KOLE on CPAP G47.33, Z99.89         Assessment and plan;    Chronic atrial fibrillation  Last cardiac testing was done in 2020  Chronic anticoagulation with warfarin  Hypertension   Diabetes mellitus  Obesity  Obstructive sleep apnea  Osteomyelitis of great toe of right foot        Plan:  Clinically no evidence of ACS /CHF.     No recent TIA or stroke or DVT or pulmonary embolism  Renal function is normal.  Please get 12 lead EKG and echocardiogram.  Consider heparin bridging once the INR is less than 2.0  Management plan was discussed in detail with patient. Thank you for allowing me to participate in management of this pleasant patient.       Signed By: Nate Burt MD     September 11, 2022

## 2022-09-12 ENCOUNTER — APPOINTMENT (OUTPATIENT)
Dept: NON INVASIVE DIAGNOSTICS | Age: 72
DRG: 617 | End: 2022-09-12
Attending: HOSPITALIST
Payer: MEDICARE

## 2022-09-12 LAB
ANION GAP SERPL CALC-SCNC: 5 MMOL/L (ref 3–18)
BASOPHILS # BLD: 0 K/UL (ref 0–0.1)
BASOPHILS NFR BLD: 1 % (ref 0–2)
BUN SERPL-MCNC: 11 MG/DL (ref 7–18)
BUN/CREAT SERPL: 21 (ref 12–20)
CALCIUM SERPL-MCNC: 8.7 MG/DL (ref 8.5–10.1)
CHLORIDE SERPL-SCNC: 105 MMOL/L (ref 100–111)
CO2 SERPL-SCNC: 28 MMOL/L (ref 21–32)
CREAT SERPL-MCNC: 0.53 MG/DL (ref 0.6–1.3)
DIFFERENTIAL METHOD BLD: ABNORMAL
ECHO AO ASC DIAM: 3.3 CM
ECHO AO ASCENDING AORTA INDEX: 1.39 CM/M2
ECHO AO ROOT DIAM: 3.2 CM
ECHO AO ROOT INDEX: 1.35 CM/M2
ECHO AV AREA PEAK VELOCITY: 2.6 CM2
ECHO AV AREA VTI: 2.9 CM2
ECHO AV AREA/BSA PEAK VELOCITY: 1.1 CM2/M2
ECHO AV AREA/BSA VTI: 1.2 CM2/M2
ECHO AV MEAN GRADIENT: 5 MMHG
ECHO AV MEAN VELOCITY: 1 M/S
ECHO AV PEAK GRADIENT: 10 MMHG
ECHO AV PEAK VELOCITY: 1.6 M/S
ECHO AV VELOCITY RATIO: 0.63
ECHO AV VTI: 23.5 CM
ECHO EST RA PRESSURE: 3 MMHG
ECHO IVC PROX: 1.9 CM
ECHO LA DIAMETER INDEX: 2.28 CM/M2
ECHO LA DIAMETER: 5.4 CM
ECHO LA TO AORTIC ROOT RATIO: 1.69
ECHO LA VOL 2C: 111 ML (ref 18–58)
ECHO LA VOL 4C: 138 ML (ref 18–58)
ECHO LA VOL BP: 141 ML (ref 18–58)
ECHO LA VOL/BSA BIPLANE: 59 ML/M2 (ref 16–34)
ECHO LA VOLUME AREA LENGTH: 151 ML
ECHO LA VOLUME INDEX A2C: 47 ML/M2 (ref 16–34)
ECHO LA VOLUME INDEX A4C: 58 ML/M2 (ref 16–34)
ECHO LA VOLUME INDEX AREA LENGTH: 64 ML/M2 (ref 16–34)
ECHO LV E' LATERAL VELOCITY: 15 CM/S
ECHO LV E' SEPTAL VELOCITY: 11 CM/S
ECHO LV EDV A2C: 93 ML
ECHO LV EDV A4C: 72 ML
ECHO LV EDV BP: 87 ML (ref 67–155)
ECHO LV EDV INDEX A4C: 30 ML/M2
ECHO LV EDV INDEX BP: 37 ML/M2
ECHO LV EDV NDEX A2C: 39 ML/M2
ECHO LV EJECTION FRACTION A2C: 60 %
ECHO LV EJECTION FRACTION A4C: 56 %
ECHO LV EJECTION FRACTION BIPLANE: 58 % (ref 55–100)
ECHO LV ESV A2C: 38 ML
ECHO LV ESV A4C: 32 ML
ECHO LV ESV BP: 37 ML (ref 22–58)
ECHO LV ESV INDEX A2C: 16 ML/M2
ECHO LV ESV INDEX A4C: 14 ML/M2
ECHO LV ESV INDEX BP: 16 ML/M2
ECHO LV FRACTIONAL SHORTENING: 33 % (ref 28–44)
ECHO LV GLOBAL LONGITUDINAL STRAIN (GLS): -14.5 %
ECHO LV INTERNAL DIMENSION DIASTOLE INDEX: 2.15 CM/M2
ECHO LV INTERNAL DIMENSION DIASTOLIC: 5.1 CM (ref 4.2–5.9)
ECHO LV INTERNAL DIMENSION SYSTOLIC INDEX: 1.43 CM/M2
ECHO LV INTERNAL DIMENSION SYSTOLIC: 3.4 CM
ECHO LV IVSD: 1.1 CM (ref 0.6–1)
ECHO LV MASS 2D: 188 G (ref 88–224)
ECHO LV MASS INDEX 2D: 79.3 G/M2 (ref 49–115)
ECHO LV POSTERIOR WALL DIASTOLIC: 0.9 CM (ref 0.6–1)
ECHO LV RELATIVE WALL THICKNESS RATIO: 0.35
ECHO LVOT AREA: 4.2 CM2
ECHO LVOT AV VTI INDEX: 0.69
ECHO LVOT DIAM: 2.3 CM
ECHO LVOT MEAN GRADIENT: 2 MMHG
ECHO LVOT PEAK GRADIENT: 4 MMHG
ECHO LVOT PEAK VELOCITY: 1 M/S
ECHO LVOT STROKE VOLUME INDEX: 28.6 ML/M2
ECHO LVOT SV: 67.7 ML
ECHO LVOT VTI: 16.3 CM
ECHO MV A VELOCITY: 1.21 M/S
ECHO MV E DECELERATION TIME (DT): 145.6 MS
ECHO MV E VELOCITY: 0.81 M/S
ECHO MV E/A RATIO: 0.67
ECHO MV E/E' LATERAL: 5.4
ECHO MV E/E' RATIO (AVERAGED): 6.38
ECHO MV E/E' SEPTAL: 7.36
ECHO RIGHT VENTRICULAR SYSTOLIC PRESSURE (RVSP): 50 MMHG
ECHO RV FREE WALL PEAK S': 15 CM/S
ECHO RV INTERNAL DIMENSION: 3.4 CM
ECHO RV TAPSE: 1.5 CM (ref 1.7–?)
ECHO TV REGURGITANT MAX VELOCITY: 3.44 M/S
ECHO TV REGURGITANT PEAK GRADIENT: 47 MMHG
EOSINOPHIL # BLD: 0.3 K/UL (ref 0–0.4)
EOSINOPHIL NFR BLD: 4 % (ref 0–5)
ERYTHROCYTE [DISTWIDTH] IN BLOOD BY AUTOMATED COUNT: 14.5 % (ref 11.6–14.5)
GLUCOSE BLD STRIP.AUTO-MCNC: 162 MG/DL (ref 70–110)
GLUCOSE BLD STRIP.AUTO-MCNC: 187 MG/DL (ref 70–110)
GLUCOSE BLD STRIP.AUTO-MCNC: 200 MG/DL (ref 70–110)
GLUCOSE BLD STRIP.AUTO-MCNC: 223 MG/DL (ref 70–110)
GLUCOSE SERPL-MCNC: 118 MG/DL (ref 74–99)
HCT VFR BLD AUTO: 37.5 % (ref 36–48)
HGB BLD-MCNC: 12.1 G/DL (ref 13–16)
IMM GRANULOCYTES # BLD AUTO: 0 K/UL (ref 0–0.04)
IMM GRANULOCYTES NFR BLD AUTO: 0 % (ref 0–0.5)
INR PPP: 1.9 (ref 0.8–1.2)
LYMPHOCYTES # BLD: 1.1 K/UL (ref 0.9–3.6)
LYMPHOCYTES NFR BLD: 18 % (ref 21–52)
MAGNESIUM SERPL-MCNC: 1.7 MG/DL (ref 1.6–2.6)
MCH RBC QN AUTO: 31.7 PG (ref 24–34)
MCHC RBC AUTO-ENTMCNC: 32.3 G/DL (ref 31–37)
MCV RBC AUTO: 98.2 FL (ref 78–100)
MONOCYTES # BLD: 0.8 K/UL (ref 0.05–1.2)
MONOCYTES NFR BLD: 13 % (ref 3–10)
NEUTS SEG # BLD: 3.9 K/UL (ref 1.8–8)
NEUTS SEG NFR BLD: 64 % (ref 40–73)
NRBC # BLD: 0 K/UL (ref 0–0.01)
NRBC BLD-RTO: 0 PER 100 WBC
PLATELET # BLD AUTO: 199 K/UL (ref 135–420)
PMV BLD AUTO: 9.4 FL (ref 9.2–11.8)
POTASSIUM SERPL-SCNC: 4.3 MMOL/L (ref 3.5–5.5)
PROTHROMBIN TIME: 22.5 SEC (ref 11.5–15.2)
RBC # BLD AUTO: 3.82 M/UL (ref 4.35–5.65)
SODIUM SERPL-SCNC: 138 MMOL/L (ref 136–145)
TROPONIN-HIGH SENSITIVITY: 15 NG/L (ref 0–78)
WBC # BLD AUTO: 6 K/UL (ref 4.6–13.2)

## 2022-09-12 PROCEDURE — 83735 ASSAY OF MAGNESIUM: CPT

## 2022-09-12 PROCEDURE — 82962 GLUCOSE BLOOD TEST: CPT

## 2022-09-12 PROCEDURE — 93306 TTE W/DOPPLER COMPLETE: CPT

## 2022-09-12 PROCEDURE — 84484 ASSAY OF TROPONIN QUANT: CPT

## 2022-09-12 PROCEDURE — 74011636637 HC RX REV CODE- 636/637: Performed by: HOSPITALIST

## 2022-09-12 PROCEDURE — 74011250636 HC RX REV CODE- 250/636: Performed by: PHYSICIAN ASSISTANT

## 2022-09-12 PROCEDURE — 77030000032 HC CUF TRNQT ZIMM -B: Performed by: PODIATRIST

## 2022-09-12 PROCEDURE — 0JBQ0ZZ EXCISION OF RIGHT FOOT SUBCUTANEOUS TISSUE AND FASCIA, OPEN APPROACH: ICD-10-PCS | Performed by: PODIATRIST

## 2022-09-12 PROCEDURE — 77030006822 HC BLD SAW SAG BRSM -B: Performed by: PODIATRIST

## 2022-09-12 PROCEDURE — 0Y6M0Z9 DETACHMENT AT RIGHT FOOT, PARTIAL 1ST RAY, OPEN APPROACH: ICD-10-PCS | Performed by: PODIATRIST

## 2022-09-12 PROCEDURE — 85025 COMPLETE CBC W/AUTO DIFF WBC: CPT

## 2022-09-12 PROCEDURE — 76010000138 HC OR TIME 0.5 TO 1 HR: Performed by: PODIATRIST

## 2022-09-12 PROCEDURE — 88305 TISSUE EXAM BY PATHOLOGIST: CPT

## 2022-09-12 PROCEDURE — 77030040361 HC SLV COMPR DVT MDII -B: Performed by: PODIATRIST

## 2022-09-12 PROCEDURE — 2709999900 HC NON-CHARGEABLE SUPPLY: Performed by: PODIATRIST

## 2022-09-12 PROCEDURE — 87205 SMEAR GRAM STAIN: CPT

## 2022-09-12 PROCEDURE — 76060000032 HC ANESTHESIA 0.5 TO 1 HR: Performed by: PODIATRIST

## 2022-09-12 PROCEDURE — 74011000250 HC RX REV CODE- 250: Performed by: PODIATRIST

## 2022-09-12 PROCEDURE — 87185 SC STD ENZYME DETCJ PER NZM: CPT

## 2022-09-12 PROCEDURE — 87077 CULTURE AEROBIC IDENTIFY: CPT

## 2022-09-12 PROCEDURE — 74011000272 HC RX REV CODE- 272: Performed by: PODIATRIST

## 2022-09-12 PROCEDURE — 74011250636 HC RX REV CODE- 250/636: Performed by: INTERNAL MEDICINE

## 2022-09-12 PROCEDURE — 80048 BASIC METABOLIC PNL TOTAL CA: CPT

## 2022-09-12 PROCEDURE — 77030013708 HC HNDPC SUC IRR PULS STRY –B: Performed by: PODIATRIST

## 2022-09-12 PROCEDURE — 74011000250 HC RX REV CODE- 250: Performed by: HOSPITALIST

## 2022-09-12 PROCEDURE — 88311 DECALCIFY TISSUE: CPT

## 2022-09-12 PROCEDURE — 74011250637 HC RX REV CODE- 250/637: Performed by: HOSPITALIST

## 2022-09-12 PROCEDURE — 77030014007 HC SPNG HEMSTAT J&J -B: Performed by: PODIATRIST

## 2022-09-12 PROCEDURE — 94660 CPAP INITIATION&MGMT: CPT

## 2022-09-12 PROCEDURE — 85610 PROTHROMBIN TIME: CPT

## 2022-09-12 PROCEDURE — 36415 COLL VENOUS BLD VENIPUNCTURE: CPT

## 2022-09-12 PROCEDURE — 74011000258 HC RX REV CODE- 258: Performed by: INTERNAL MEDICINE

## 2022-09-12 PROCEDURE — 65270000029 HC RM PRIVATE

## 2022-09-12 PROCEDURE — 77030018390 HC SPNG HEMSTAT2 J&J -B: Performed by: PODIATRIST

## 2022-09-12 PROCEDURE — 77030020782 HC GWN BAIR PAWS FLX 3M -B: Performed by: PODIATRIST

## 2022-09-12 PROCEDURE — 87075 CULTR BACTERIA EXCEPT BLOOD: CPT

## 2022-09-12 PROCEDURE — 74011000258 HC RX REV CODE- 258: Performed by: PHYSICIAN ASSISTANT

## 2022-09-12 RX ORDER — GABAPENTIN 300 MG/1
300 CAPSULE ORAL 3 TIMES DAILY
COMMUNITY

## 2022-09-12 RX ORDER — TAMSULOSIN HYDROCHLORIDE 0.4 MG/1
0.4 CAPSULE ORAL DAILY
COMMUNITY

## 2022-09-12 RX ORDER — FUROSEMIDE 40 MG/1
TABLET ORAL DAILY
COMMUNITY

## 2022-09-12 RX ORDER — CARVEDILOL 25 MG/1
25 TABLET ORAL 2 TIMES DAILY WITH MEALS
COMMUNITY

## 2022-09-12 RX ADMIN — Medication 4 UNITS: at 21:47

## 2022-09-12 RX ADMIN — SODIUM CHLORIDE, PRESERVATIVE FREE 10 ML: 5 INJECTION INTRAVENOUS at 05:34

## 2022-09-12 RX ADMIN — VANCOMYCIN HYDROCHLORIDE 1250 MG: 10 INJECTION, POWDER, LYOPHILIZED, FOR SOLUTION INTRAVENOUS at 17:24

## 2022-09-12 RX ADMIN — VANCOMYCIN HYDROCHLORIDE 1250 MG: 10 INJECTION, POWDER, LYOPHILIZED, FOR SOLUTION INTRAVENOUS at 06:36

## 2022-09-12 RX ADMIN — CEFEPIME 2 G: 2 INJECTION, POWDER, FOR SOLUTION INTRAVENOUS at 05:32

## 2022-09-12 RX ADMIN — Medication 4 UNITS: at 11:56

## 2022-09-12 RX ADMIN — SPIRONOLACTONE 12.5 MG: 25 TABLET ORAL at 09:48

## 2022-09-12 RX ADMIN — SODIUM CHLORIDE, PRESERVATIVE FREE 10 ML: 5 INJECTION INTRAVENOUS at 01:16

## 2022-09-12 RX ADMIN — CEFTRIAXONE 1 G: 1 INJECTION, POWDER, FOR SOLUTION INTRAMUSCULAR; INTRAVENOUS at 12:04

## 2022-09-12 RX ADMIN — OXYCODONE AND ACETAMINOPHEN 1 TABLET: 5; 325 TABLET ORAL at 18:48

## 2022-09-12 RX ADMIN — SODIUM CHLORIDE, PRESERVATIVE FREE 10 ML: 5 INJECTION INTRAVENOUS at 21:48

## 2022-09-12 RX ADMIN — LOSARTAN POTASSIUM 50 MG: 50 TABLET, FILM COATED ORAL at 09:48

## 2022-09-12 RX ADMIN — ACETAMINOPHEN 650 MG: 325 TABLET ORAL at 06:51

## 2022-09-12 RX ADMIN — MONTELUKAST 10 MG: 10 TABLET, FILM COATED ORAL at 18:48

## 2022-09-12 RX ADMIN — Medication 2 UNITS: at 16:37

## 2022-09-12 RX ADMIN — Medication 2 UNITS: at 09:48

## 2022-09-12 RX ADMIN — ATORVASTATIN CALCIUM 40 MG: 20 TABLET, FILM COATED ORAL at 18:48

## 2022-09-12 NOTE — ROUTINE PROCESS
Patient's blood sugar is 63, 4oz of apple juice given. 1625--Unable to retest at 15 minute marker. Nursing assistant not able to retest.  At 21 , patient's blood sugar is 89. Dinner tray arriving.

## 2022-09-12 NOTE — PROGRESS NOTES
Hospitalist Progress Note    Patient: Beth Garay MRN: [id number]  CSN: [id number]    YOB: 1950  Age: 67 y.o. Sex: male    DOA: 9/11/2022 LOS:  LOS: 1 day                Assessment/Plan     Patient Active Problem List   Diagnosis Code    Osteomyelitis of great toe of right foot ([facility] M86.9    A-fib ([facility] [address] I48.91    Controlled type 2 diabetes mellitus, with long-term current use of insulin ([facility]) E11.9, Z79.4    KOLE on CPAP G47.33, Z99.89        Chief complaint :  Left foot pain and redness  67 y.o. male with hypertension, A. fib on warfarin,  DM, kole on cpap was sent to ER per Dr. Musa Iglesias podiatrist due to left toe OM. Osteomyelitis of left toe -  Local wound care  Plan for surgery today  ID consult  Continue IV antibiotics     A. fib on warfarin -  Continue monitoring,   Seen by cardiology  EKG, troponin, echo  Hold warfarin for now for the procedure  Daily INR     KOLE -  On CPAP we will continue     Hypertension -  Continue home medication        DM type II , with complication -  On Lantus. uses 7030 60/70 twice a day at home,  Ssi also        Disposition : 1-2 days    Review of systems  General: No fevers or chills. Cardiovascular: No chest pain or pressure. No palpitations. Pulmonary: No shortness of breath. Gastrointestinal: No nausea, vomiting. Physical Exam:  General: Awake, cooperative, no acute distress    HEENT: NC, Atraumatic. PERRLA, anicteric sclerae. Lungs: CTA Bilaterally. No Wheezing/Rhonchi/Rales. Heart:  S1 S2,  No murmur, No Rubs, No Gallops  Abdomen: Soft, Non distended, Non tender. +Bowel sounds,   Extremities: Left toe swelling and redness. Psych:   Not anxious or agitated. Neurologic:  No acute neurological deficit. Vital signs/Intake and Output:  Visit Vitals  BP (!) 148/80   Pulse 84   Temp 97.7 °F (36.5 °C)   Resp 17   Ht 5' 8\" (1.727 m)   Wt 127.9 kg (282 lb)   SpO2 98%   BMI 42.88 kg/m²     Current Shift:  No intake/output data recorded.   Last three shifts:  No intake/output data recorded. Labs: Results:       Chemistry Recent Labs     09/12/22  0429 09/11/22  1345   * 79    140   K 4.3 4.5    108   CO2 28 28   BUN 11 15   CREA 0.53* 0.65   CA 8.7 9.0   AGAP 5 4   BUCR 21* 23*   AP  --  71   TP  --  7.0   ALB  --  3.3*   GLOB  --  3.7   AGRAT  --  0.9      CBC w/Diff Recent Labs     09/12/22  0429 09/11/22  1345   WBC 6.0 6.5   RBC 3.82* 4.00*   HGB 12.1* 12.9*   HCT 37.5 39.7    218   GRANS 64 69   LYMPH 18* 14*   EOS 4 4      Cardiac Enzymes No results for input(s): CPK, CKND1, YAZ in the last 72 hours. No lab exists for component: CKRMB, TROIP   Coagulation Recent Labs     09/12/22  0400 09/11/22  1345   PTP 22.5* 25.5*   INR 1.9* 2.3*       Lipid Panel No results found for: CHOL, CHOLPOCT, CHOLX, CHLST, CHOLV, 380891, HDL, HDLP, LDL, LDLC, DLDLP, 310448, VLDLC, VLDL, TGLX, TRIGL, TRIGP, TGLPOCT, CHHD, CHHDX   BNP No results for input(s): BNPP in the last 72 hours.    Liver Enzymes Recent Labs     09/11/22  1345   TP 7.0   ALB 3.3*   AP 71      Thyroid Studies No results found for: T4, T3U, TSH, TSHEXT     Procedures/imaging: see electronic medical records for all procedures/Xrays and details which were not copied into this note but were reviewed prior to creation of Plan

## 2022-09-12 NOTE — PROGRESS NOTES
Cardiology Progress Note        Patient: Lonia Soulier        Sex: male          DOA: 9/11/2022  YOB: 1950      Age:  67 y.o.        LOS:  LOS: 1 day   Assessment/Plan     Principal Problem:    Osteomyelitis of great toe of right foot ([address] (9/11/2022)    Active Problems:    A-fib ([address] (9/11/2022)      Controlled type 2 diabetes mellitus, with long-term current use of insulin ([address] (9/11/2022)      KOLE on CPAP (9/11/2022)        Plan:    Atrial fibrillation rate controlled  Echocardiogram normal EF  Continue with current medication  Continue with anticoagulation  Discussed with patient. Subjective:    cc:  atrial fibrillation        REVIEW OF SYSTEMS:     General: No fevers or chills. Cardiovascular: No chest pain or pressure. No palpitations. No ankle swelling  Pulmonary: No SOB, orthopnea, PND  Gastrointestinal: No nausea, vomiting or diarrhea      Objective:      Visit Vitals  /79   Pulse 89   Temp 97.7 °F (36.5 °C)   Resp 16   Ht 5' 8\" (1.727 m)   Wt 127.9 kg (282 lb)   SpO2 96%   BMI 42.88 kg/m²     Body mass index is 42.88 kg/m². Physical Exam:  General Appearance: Comfortable, not using accessory muscles of respiration. NECK: No JVD, no thyroidomeglay. LUNGS: Clear bilaterally. HEART: S1+S2 audible,    ABD: Non-tender, BS Audible    PSYCHIATRIC EXAM: Mood is appropriate.     Medication:  Current Facility-Administered Medications   Medication Dose Route Frequency    cefTRIAXone (ROCEPHIN) 1 g in 0.9% sodium chloride (MBP/ADV) 50 mL MBP  1 g IntraVENous Q24H    [START ON 9/13/2022] Vancomycin Random Level with am labs 9/13/2022  1 Each Other ONCE    Vancomycin Pharmacy Dosing   1 Each Other Rx Dosing/Monitoring    sodium chloride (NS) flush 5-40 mL  5-40 mL IntraVENous Q8H    sodium chloride (NS) flush 5-40 mL  5-40 mL IntraVENous PRN    acetaminophen (TYLENOL) tablet 650 mg  650 mg Oral Q6H PRN    Or    acetaminophen (TYLENOL) suppository 650 mg  650 mg Rectal Q6H PRN    bisacodyL (DULCOLAX) suppository 10 mg  10 mg Rectal DAILY PRN    promethazine (PHENERGAN) tablet 12.5 mg  12.5 mg Oral Q6H PRN    Or    ondansetron (ZOFRAN) injection 4 mg  4 mg IntraVENous Q6H PRN    insulin lispro (HUMALOG) injection   SubCUTAneous AC&HS    glucose chewable tablet 16 g  4 Tablet Oral PRN    glucagon (GLUCAGEN) injection 1 mg  1 mg IntraMUSCular PRN    dextrose 10% infusion 0-250 mL  0-250 mL IntraVENous PRN    [Held by provider] insulin glargine (LANTUS) injection 50 Units  50 Units SubCUTAneous QHS    atorvastatin (LIPITOR) tablet 40 mg  40 mg Oral PCD    losartan (COZAAR) tablet 50 mg  50 mg Oral DAILY    montelukast (SINGULAIR) tablet 10 mg  10 mg Oral PCD    spironolactone (ALDACTONE) tablet 12.5 mg  12.5 mg Oral DAILY    vancomycin (VANCOCIN) 1250 mg in  ml infusion  1,250 mg IntraVENous Q12H    oxyCODONE-acetaminophen (PERCOCET) 5-325 mg per tablet 1 Tablet  1 Tablet Oral Q6H PRN               Lab/Data Reviewed:  Procedures/imaging: see electronic medical records for all procedures/Xrays   and details which were not copied into this note but were reviewed prior to creation of Plan       All lab results for the last 24 hours reviewed. Recent Labs     09/12/22  0429 09/11/22  1345   WBC 6.0 6.5   HGB 12.1* 12.9*   HCT 37.5 39.7    218     Recent Labs     09/12/22  0429 09/11/22  1345    140   K 4.3 4.5    108   CO2 28 28   * 79   BUN 11 15   CREA 0.53* 0.65   CA 8.7 9.0       RADIOLOGY:  CT Results  (Last 48 hours)      None          CXR Results  (Last 48 hours)      None              Cardiology Procedures: No results found for this or any previous visit.    Echo Results  (Last 48 hours)      None         Cardiolite (Tc-99m Sestamibi) stress test    Signed By: Susi Gee MD     September 12, 2022

## 2022-09-12 NOTE — DIABETES MGMT
GLYCEMIC CONTROL PLAN OF CARE      Diabetes/ Glycemic Control Plan of Care  Recommendations:   BG trending up,do believe pt would benefit from scheduled dose once PO intake resumes     Assessment: known h/o T2DM, HbA1C not within recommended range for age + comorbids mixed insulin/oral home regimen    Addendum:  - BG trending up today r/t basal insulin held    DX:   1. Osteomyelitis of great toe of right foot (HCC)           Fasting/ Morning blood glucose:   Lab Results   Component Value Date/Time    Glucose 118 (H) 09/12/2022 04:29 AM    Glucose (POC) 223 (H) 09/12/2022 11:19 AM     Recent blood glucose:   Lab Results   Component Value Date/Time     (H) 09/12/2022 04:29 AM    GLU 79 09/11/2022 01:45 PM    GLUCPOC 223 (H) 09/12/2022 11:19 AM    GLUCPOC 187 (H) 09/12/2022 08:47 AM    GLUCPOC 100 09/11/2022 10:19 PM           IV Fluids containing dextrose: none  Steroids:   no    Blood glucose values:  mg/dL    Within target range (non-ICU: <140; ICU<180):  [] Yes    [x] No  Current insulin orders: - Humalog Normal Insulin Sensitivity Corrective Coverage'  Total Daily Dose previous 24 hours = 2  Current A1c:   Lab Results   Component Value Date/Time    Hemoglobin A1c 7.1 (H) 09/11/2022 01:45 PM      equivalent  to ave Blood Glucose of 157 mg/dl for 2-3 months prior to admission  Adequate glycemic control PTA:   [] Yes   [x] No  Nutrition/Diet:   Active Orders   Diet    DIET NPO      Meal Intake:  No data found. Supplement Intake:  No data found. Home diabetes medications:   Key Antihyperglycemic Medications               metFORMIN ER (GLUCOPHAGE XR) 750 mg tablet (Taking) Take 750 mg by mouth Daily (before dinner). Two tablets    insulin NPH/insulin regular (HumuLIN 70/30 U-100 Insulin) 100 unit/mL (70-30) injection by SubCUTAneous route. Plan/Goals:   Blood Glucose will be within target range within 72 hours  Reinforce dietary and medication compliance at home.           Education:  [x] Refer to Diabetes Education Record                       [] Education not indicated at this time       Abraham Jackson 87, RN, CDE  Glycemic Control Team  [phone]

## 2022-09-12 NOTE — PROGRESS NOTES
Dr. Mando Ramírez returned paged, informed patient would be receiving local anesthesia for surgery, and MD states, \"that will be fine. \"

## 2022-09-12 NOTE — DIABETES MGMT
Diabetes Patient/Family Education Record    Factors That May Influence Patients Ability to Learn or Comply with Recommendations   []   Language barrier    []   Cultural needs   []   Motivation    []   Cognitive limitation    []   Physical   []   Education    []   Physiological factors   []   Hearing/vision/speaking impairment   []   Hoahaoism beliefs    []   Financial factors   []  Other:   [x]  No factors identified at this time. Person Instructed:   [x]   Patient   []   Family   []  Other     Preference for Learning:   [x]   Verbal   []   Written   []  Demonstration     Level of Comprehension & Competence:    [x]  Good                                      [] Fair                                     []  Poor                             []  Needs Reinforcement   [x]  Teach back completed    Education Component:   [x]  Medication management, including how to administer insulin (if appropriate) and potential medication interactions' confirmed home regimen   []  Nutritional management - [] Obtained usual meal pattern   []   Basic carbohydrate counting  []  Plate method  []  Limit concentrated sweets and avoid sweetened beverages  []  Portion control  []    Avoid skipping meals   []  Exercise   []  Signs, symptoms, and treatment of hyperglycemia and hypoglycemia   [x] Prevention, recognition and treatment of hyperglycemia and hypoglycemia; occasional hypoglycemia   [x]  Importance of blood glucose monitoring  [] Blood Glucose targets   []   Provided patient with blood glucose meter  []  Has glucometer and supplies at home' wears freestyle tracy checks QID    []  Instruction on use of the blood glucose meter and recommended monitoring schedule   [x]  Discuss the importance of HbA1C monitoring. Patients A1c is 7.1___ %.  This is equivalent to average glucose of _157__ mg/dl for the past 2-3 months.   []  Sick day guidelines   []  Proper use and disposal of lancets, needles, syringes or insulin pens (if appropriate)   []  Potential long-term complications (retinopathy, kidney disease, neuropathy, foot care)   [] Information about whom to contact in case of emergency or for more information    [x]  Goal:  Patient/family will demonstrate understanding of Diabetes Self- Management Skills by: (date) ___11/30____  Plan for post-discharge education or self-management support:    [] Outpatient class schedule provided            [] Patient Declined    [] Scheduled for outpatient classes (date) _______    [] Written information provided  Verify: [x] Prior to admission Diabetes medications    Does patient understand how diabetes medications work? ______yes______________________  Does patient have difficulty obtaining diabetes medications or testing supplies? _____no____________      Alysia Osuna MS, RN, CDE  Glycemic Control Team  [phone]

## 2022-09-12 NOTE — PROGRESS NOTES
[id number] [facility] Pharmacokinetic Monitoring Service - Vancomycin    Consulting Provider: Jack SIMS   Indication: Bone and Joint Infetion  Target Concentration: Goal AUC/DEDE 400-600 mg*hr/L  Day of Therapy: 2  Additional Antimicrobials: Rocephin    Pertinent Laboratory Values: Wt Readings from Last 1 Encounters:   09/12/22 127.9 kg (282 lb)     Temp Readings from Last 1 Encounters:   09/12/22 97.9 °F (36.6 °C)     No components found for: PROCAL  Estimated Creatinine Clearance: 124.4 mL/min (A) (by C-G formula based on SCr of 0.53 mg/dL (L)).   Recent Labs     09/12/22  0429 09/11/22  1345   WBC 6.0 6.5     Plan:  Estimated  mg/l.hr  Trough 10.5 mg/l  Ordered Vancomycin Random with am labs 9/13/2022  Pharmacy will continue to monitor patient and adjust therapy as indicated    MIGUEL ANGEL Lainez [facility]  9/12/2022 11:44 AM   [phone]

## 2022-09-12 NOTE — CONSULTS
[facility]  (A Division of [address] [place] [address])    Jhonatan Kelly MD  Office #: - Option # [phone]  Fax #: [phone]     Date of Admission: 9/11/2022      Date of Note: 9/12/2022     Reason for Referral: Evaluation and antibiotic management of foot infection/ OM     Thank you for involving me in the care of this patient. Please do not hesitate to contact me on the above number if question or concern. Current Antimicrobials:    Prior Antimicrobials:  Cefepime and vancomycin   Oral ABX PTA   Antibiotic allergy: Bactrim ? Assessment:     R hallux OM with abscess - proximal and distal phalanx on MRI 9/8/22  Blood culture 9/11: NGSF  Wound culture 9/12- GS/culture- pending  DM reasonable control 0 A1C 7.1%  A.fib on AC-Coumadin  Morbidly obese BMI 42    Recommendation -- ID related:     Cont Vancomycin  Cont GNR coverage with ceftriaxone  OR for hallux ampuation - in plans. If infection resected with clear margin, no long term treatment will be indicated-- Tissue culture and biopsy from OR  Patient hoping for oral ABX treatment-- TBD    Will follow. CHRISTA HUMPHRIES/primary team        HPI:     Lay Bautista is a 67 y.o. WHITE/NON- with PMH of DM, A.fib on AC- Coumadin,R great toe OM-- he had onset of R hallux infection from 2 weeks ago. Onset of pain and swelling- without trauma 2 weeks ago-- seen in [facility]- 8/24--I and D done and given Augmentin and Bactrim. Sent to wound clinic per Care Everywhere review-- on 48 hour FU. Seen by PCP, who referred him to Podiatry. MRI done on 9/8 showing OM and abscess, sent in to ED for admission. Per patient, he doesn't recall any allergic reaction to Bactrim, however, he has it listed as allergy. Currently, tolerating Vanco and cefepime so far. Tells me that OR for toe resection in  plans by Dr Jersey Mendenhall. No F/C/R/SOB/ chest pain. No urinary issue. Chart reviewed on prior notes.   Care Everywhere reviewed- in S system. Imaging repots reviewed- in [facility]  system    Osteomyelitis of great toe of right foot ([facility] [address] [M86.9]  No past surgical history on file. No family history on file.   Medications reviewed as below:   Current Facility-Administered Medications   Medication Dose Route Frequency Provider Last Rate Last Admin    cefepime (MAXIPIME) 2 g in 0.9% sodium chloride (MBP/ADV) 100 mL MBP  2 g IntraVENous Q8H LEVI Sher 200 mL/hr at 09/12/22 0532 2 g at 09/12/22 0532    Vancomycin Pharmacy Dosing   1 Each Other Rx Dosing/Monitoring LEVI Cummings        sodium chloride (NS) flush 5-40 mL  5-40 mL IntraVENous Q8H Gray Blandon MD   10 mL at 09/12/22 0534    sodium chloride (NS) flush 5-40 mL  5-40 mL IntraVENous PRN Gray Bladnon MD        acetaminophen (TYLENOL) tablet 650 mg  650 mg Oral Q6H PRN Gray Blandon MD   650 mg at 09/12/22 1639    Or    acetaminophen (TYLENOL) suppository 650 mg  650 mg Rectal Q6H PRN Gray Blandon MD        bisacodyL (DULCOLAX) suppository 10 mg  10 mg Rectal DAILY PRN Gray Blandon MD        promethazine (PHENERGAN) tablet 12.5 mg  12.5 mg Oral Q6H PRN Gray Blandon MD        Or    ondansetron [facility]) injection 4 mg  4 mg IntraVENous Q6H PRN Gray Blandon MD        insulin lispro (HUMALOG) injection   SubCUTAneous AC&HS Gray Blandon MD   2 Units at 09/12/22 0948    glucose chewable tablet 16 g  4 Tablet Oral PRN Gray Blandon MD        glucagon (GLUCAGEN) injection 1 mg  1 mg IntraMUSCular PRN Gray Blandon MD        dextrose 10% infusion 0-250 mL  0-250 mL IntraVENous PRCAITLYN Blandon MD        [facility] [place] by provider] insulin glargine (LANTUS) injection 50 Units  50 Units SubCUTAneous QHS Gray Blandon MD        atorvastatin (LIPITOR) tablet 40 mg  40 mg Oral PCD Gray Blandon MD   40 mg at 09/11/22 1959    losartan (COZAAR) tablet 50 mg  50 mg Oral DAILY Gray Blandon MD   50 mg at 09/12/22 0948    montelukast (SINGULAIR) tablet 10 mg  10 mg Oral PCD Gray Blandon MD   10 mg at 09/11/22 1959    spironolactone (ALDACTONE) tablet 12.5 mg  12.5 mg Oral DAILY Gray Blandon MD   12.5 mg at 09/12/22 0948    vancomycin (VANCOCIN) 1250 mg in  ml infusion  1,250 mg IntraVENous Q12H Ander Sher 125 mL/hr at 09/12/22 0636 1,250 mg at 09/12/22 0636    oxyCODONE-acetaminophen (PERCOCET) 5-325 mg per tablet 1 Tablet  1 Tablet Oral Q6H PRN Gray Blandon MD         Allergies   Allergen Reactions    Sulfa (Sulfonamide Antibiotics) Unknown (comments)     Social History     Socioeconomic History    Marital status:      Spouse name: Not on file    Number of children: Not on file    Years of education: Not on file    Highest education level: Not on file   Occupational History    Not on file   Tobacco Use    Smoking status: Not on file    Smokeless tobacco: Not on file   Substance and Sexual Activity    Alcohol use: Not on file    Drug use: Not on file    Sexual activity: Not on file   Other Topics Concern    Not on file   Social History Narrative    Not on file     Social Determinants of Health     Financial Resource Strain: Not on file   Food Insecurity: Not on file   Transportation Needs: Not on file   Physical Activity: Not on file   Stress: Not on file   Social Connections: Not on file   Intimate Partner Violence: Not on file   Housing Stability: Not on file        Review of Systems    Negative Unless BOLDED    General: fevers, chills, myalgias, arthralgias, unexplained weight loss, malaise, fatigue. HEENT:  headaches,sinus pain or presure, recent URI, recent dental procedures;  tinnitus, hearing loss , visual changes, catarats, dizziness or blurred vision  PUlMONARY:  cough , shortness of breath, sputum production, hx of asthma or COPD. previous treatement for TB or PPD. Cardiovascular: chest pain, previous CAD/MI, vavlular heart disease,  murmurs  GI:   nausea, vomiting, diarrhea, abdominal pain, prior C.diff  :  urinary frequency, dysuria, hematuria, bladder incontinence.    Neurologic:  seizures, syncope or prior CVA/TIA, confusion, memory impairment, neuropathy  Musculoskeletal:  myalgias arthralgias, joint pain/ swelling,  back pain  Skin:  Purities, cellulitis foot,   chronic stasis ulcer, diabetic foot ulcers  Endocrine: polyuria, polydipsia, hair loss, weight gain  Psych: Denies depression or treatment by a psychiatrist/psycologist  Heme-Onc: prior DVT, easy bruising, fatigue, malignancy        Objective:      Visit Vitals  /79 (BP 1 Location: Left upper arm, BP Patient Position: At rest)   Pulse 85   Temp 97.9 °F (36.6 °C)   Resp 18   Ht 5' 8\" (1.727 m)   Wt 127.9 kg (282 lb)   SpO2 96%   BMI 42.88 kg/m²     Temp (24hrs), Av.1 °F (36.7 °C), Min:97.8 °F (36.6 °C), Max:98.6 °F (37 °C)      Lines / Catheters:   PIV    General:   awake alert and oriented- on RA no distress   Skin:   no rashes or skin lesions noted on limited exam  R hallux-- swollen, red and open drainage from 1st PIP joint site   HEENT:  Normocephalic, atraumatic,EOMI, no scleral icterus or pallor; no conjunctival hemmohage; No thrush. Dentition good. Neck supple, no bruits. Lymph Nodes:   no cervical, axillary or inguinal adenopathy   Lungs:   non-labored, bilaterally clear to aspiration- no crackles wheezes rales or rhonchi   Heart:  RRR, s1 and s2; no murmurs rubs or gallops, no edema, + pedal pulses   Abdomen:  soft, non-distended, active bowel sounds. Appropriate surgical scars for stated surgeries. Non-tender   Genitourinary:  deferred   Extremities:   no clubbing, cyanosis; no joint effusions or swelling; Full ROM of all large joints to the upper and lower extremities; muscle mass appropriate for age   Neurologic:  No gross focal sensory abnormalities; 5/5 muscle strength to upper and lower extremities. Speech appropirate. Cranial nerves intact   Psychiatric:   appropriate and interactive.      Results       Procedure Component Value Units Date/Time    CULTURE, Biagio Holstein STAIN [[id number]] Collected: 22 0049    Order Status: Sent Specimen: Wound from Drainage Updated: 09/12/22 1012    CULTURE, BLOOD [[id number]] Collected: 09/11/22 1400    Order Status: Completed Specimen: Blood Updated: 09/12/22 0626     Special Requests: NO SPECIAL REQUESTS        Culture result: NO GROWTH AFTER 16 HOURS       CULTURE, BLOOD [[id number]] Collected: 09/11/22 1345    Order Status: Completed Specimen: Blood Updated: 09/12/22 0626     Special Requests: NO SPECIAL REQUESTS        Culture result: NO GROWTH AFTER 16 HOURS               Labs: Results:   Chemistry Recent Labs     09/12/22 0429 09/11/22  1345   * 79    140   K 4.3 4.5    108   CO2 28 28   BUN 11 15   CREA 0.53* 0.65   CA 8.7 9.0   AGAP 5 4   BUCR 21* 23*   AP  --  71   TP  --  7.0   ALB  --  3.3*   GLOB  --  3.7   AGRAT  --  0.9      CBC w/Diff Recent Labs     09/12/22 0429 09/11/22 1345   WBC 6.0 6.5   RBC 3.82* 4.00*   HGB 12.1* 12.9*   HCT 37.5 39.7    218   GRANS 64 69   LYMPH 18* 14*   EOS 4 4            No results found for: SDES Lab Results   Component Value Date/Time    Culture result: NO GROWTH AFTER 16 HOURS 09/11/2022 02:00 PM    Culture result: NO GROWTH AFTER 16 HOURS 09/11/2022 01:45 PM          Imaging: All imaging reviewed from Admission to present as per radiology interpretation in [address] [place] [address]   MRI of foot- 9/8/22 Care Everywhere  1. Loss of cortical definition of the proximal and distal phalanx of the first digit along with some bone marrow edema and heterogeneous enhancement suggestive of osteomyelitis. There is also a fluid collection at the medial aspect of the first interphalangeal joint worrisome for abscess. 2. Soft tissue edema. Consider cellulitis.    3. The findings and impression of this report were called to the treating physician, Charo with read back at 9:15 PM.

## 2022-09-12 NOTE — PROGRESS NOTES
PT order received and chart reviewed. Pt on surgery schedule for amputation of R great toe today with Dr. Kyree Mcfarlane. Will initiate PT evaluation following surgery. Please include weight bearing recommendations. Thank you.    Awilda Vo, PT, DPT

## 2022-09-12 NOTE — PROGRESS NOTES
Physician Progress Note      PATIENT:               Mary Beth Laguna  CSN #:                  [id number]  :                       1950  ADMIT DATE:       2022 1:26 PM  [address] [place] DATE:  RESPONDING  PROVIDER #:        Shikha Nunez MD          QUERY TEXT:    Patient admitted with osteomyelitis, noted to have chronic atrial fibrillation and is maintained on Warfarin. If possible, please document in progress notes and discharge summary if you are evaluating and/or treating any of the following: The medical record reflects the following:    Risk Factors: 67year old male, HTN, Chronic afib, DM, Obesity    Clinical Indicators:  > 67year old male, HTN, Chronic afib, DM, Obesity    Treatment: receiving Cardiology consult, Warfarin    Kori Ding RN, BSN, Regan Bustos / Fito Quiros, [address] [facility] [email or web address]  Options provided:  -- Secondary hypercoagulable state in a patient with atrial fibrillation  -- Other - I will add my own diagnosis  -- Disagree - Not applicable / Not valid  -- Disagree - Clinically unable to determine / Unknown  -- Refer to Clinical Documentation Reviewer    PROVIDER RESPONSE TEXT:    This patient has secondary hypercoagulable state related to atrial fibrillation.     Query created by: Herrera Ward on 2022 4:14 PM      Electronically signed by:  Shikha Nunez MD 2022 5:36 PM

## 2022-09-12 NOTE — PROGRESS NOTES
Problem: Falls - Risk of  Goal: *Absence of Falls  Description: Document Moshe Tomas Fall Risk and appropriate interventions in the flowsheet. Outcome: Progressing Towards Goal  Note: Fall Risk Interventions:  Mobility Interventions: Patient to call before getting OOB         Medication Interventions: Patient to call before getting OOB, Teach patient to arise slowly                   Problem: Patient Education: Go to Patient Education Activity  Goal: Patient/Family Education  Outcome: Progressing Towards Goal     Problem: Diabetes Self-Management  Goal: *Disease process and treatment process  Description: Define diabetes and identify own type of diabetes; list 3 options for treating diabetes. Outcome: Progressing Towards Goal  Goal: *Incorporating nutritional management into lifestyle  Description: Describe effect of type, amount and timing of food on blood glucose; list 3 methods for planning meals. Outcome: Progressing Towards Goal  Goal: *Incorporating physical activity into lifestyle  Description: State effect of exercise on blood glucose levels. Outcome: Progressing Towards Goal  Goal: *Developing strategies to promote health/change behavior  Description: Define the ABC's of diabetes; identify appropriate screenings, schedule and personal plan for screenings. Outcome: Progressing Towards Goal  Goal: *Using medications safely  Description: State effect of diabetes medications on diabetes; name diabetes medication taking, action and side effects. Outcome: Progressing Towards Goal  Goal: *Monitoring blood glucose, interpreting and using results  Description: Identify recommended blood glucose targets  and personal targets. Outcome: Progressing Towards Goal  Goal: *Prevention, detection, treatment of acute complications  Description: List symptoms of hyper- and hypoglycemia; describe how to treat low blood sugar and actions for lowering  high blood glucose level.   Outcome: Progressing Towards Goal  Goal: *Prevention, detection and treatment of chronic complications  Description: Define the natural course of diabetes and describe the relationship of blood glucose levels to long term complications of diabetes.   Outcome: Progressing Towards Goal  Goal: *Developing strategies to address psychosocial issues  Description: Describe feelings about living with diabetes; identify support needed and support network  Outcome: Progressing Towards Goal  Goal: *Insulin pump training  Outcome: Progressing Towards Goal  Goal: *Sick day guidelines  Outcome: Progressing Towards Goal  Goal: *Patient Specific Goal (EDIT GOAL, INSERT TEXT)  Outcome: Progressing Towards Goal     Problem: Patient Education: Go to Patient Education Activity  Goal: Patient/Family Education  Outcome: Progressing Towards Goal

## 2022-09-12 NOTE — ROUTINE PROCESS
Bedside shift change report given to ARIAS Dumont (oncoming nurse) by Salima Voss RN   (offgoing nurse). Report included the following information SBAR, Kardex, Intake/Output, and Recent Results.

## 2022-09-12 NOTE — CONSULTS
History & Physical      Patient: Katherine Smith               Sex: male          DOA: 9/11/2022       YOB: 1950      Age:  67 y.o.        LOS:  LOS: 1 day               Subjective:   Katherine Smith is a 67 y.o. male  who I was consulted to see for infected great toe of the right foot. Previous MRI shows evidence of osteomyelitis. .      Medications:     Current Facility-Administered Medications:     cefTRIAXone (ROCEPHIN) 1 g in 0.9% sodium chloride (MBP/ADV) 50 mL MBP, 1 g, IntraVENous, Q24H, Jayda Caicedo MD, Last Rate: 100 mL/hr at 09/12/22 1204, 1 g at 09/12/22 1204    [START ON 9/13/2022] Vancomycin Random Level with am labs 9/13/2022, 1 Each, Other, ONCE, Ana Solorio PA    Vancomycin Pharmacy Dosing , 1 Each, Other, Rx Dosing/Monitoring, Ana Pham PA    sodium chloride (NS) flush 5-40 mL, 5-40 mL, IntraVENous, Q8H, Bonifacio Dorman MD, 10 mL at 09/12/22 0534    sodium chloride (NS) flush 5-40 mL, 5-40 mL, IntraVENous, PRN, Bonifacio Dorman MD    acetaminophen (TYLENOL) tablet 650 mg, 650 mg, Oral, Q6H PRN, 650 mg at 09/12/22 0651 **OR** acetaminophen (TYLENOL) suppository 650 mg, 650 mg, Rectal, Q6H PRN, Bonifacio Dorman MD    bisacodyL (DULCOLAX) suppository 10 mg, 10 mg, Rectal, DAILY PRN, Bonifacio Dorman MD    promethazine (PHENERGAN) tablet 12.5 mg, 12.5 mg, Oral, Q6H PRN **OR** ondansetron (ZOFRAN) injection 4 mg, 4 mg, IntraVENous, Q6H PRN, Bonifacio Dorman MD    insulin lispro (HUMALOG) injection, , SubCUTAneous, AC&HS, Bonifacio Dorman MD, 2 Units at 09/12/22 1637    glucose chewable tablet 16 g, 4 Tablet, Oral, PRN, Bonifacio Dorman MD    glucagon (GLUCAGEN) injection 1 mg, 1 mg, IntraMUSCular, PRN, Bonifacio Dorman MD    dextrose 10% infusion 0-250 mL, 0-250 mL, IntraVENous, PRN, Bonifacio Dorman MD    [facility] [place] by provider] insulin glargine (LANTUS) injection 50 Units, 50 Units, SubCUTAneous, QHS, Bonifacio Dorman MD    atorvastatin (LIPITOR) tablet 40 mg, 40 mg, Oral, PCD, Bonifacio Dorman MD, 40 mg at 09/11/22 1959    losartan (COZAAR) tablet 50 mg, 50 mg, Oral, DAILY, Nancy Obregon MD, 50 mg at 09/12/22 0948    montelukast (SINGULAIR) tablet 10 mg, 10 mg, Oral, PCD, Nancy Obregon MD, 10 mg at 09/11/22 1959    spironolactone (ALDACTONE) tablet 12.5 mg, 12.5 mg, Oral, DAILY, Nancy Obregon MD, 12.5 mg at 09/12/22 0948    vancomycin (VANCOCIN) 1250 mg in  ml infusion, 1,250 mg, IntraVENous, Q12H, Mamadou Seaman PA, Last Rate: 125 mL/hr at 09/12/22 0636, 1,250 mg at 09/12/22 0636    oxyCODONE-acetaminophen (PERCOCET) 5-325 mg per tablet 1 Tablet, 1 Tablet, Oral, Q6H PRN, Nancy Obregon MD    Review of Systems  Negative for chest pain and shortness of breath        Objective:      Visit Vitals  /70   Pulse 87   Temp 97.4 °F (36.3 °C)   Resp 17   Ht 5' 8\" (1.727 m)   Wt 127.9 kg (282 lb)   SpO2 96%   BMI 42.88 kg/m²       Physical Exam:  Draining wound of the great toe. Erythema and edema    Labs:  Recent Results (from the past 24 hour(s))   GLUCOSE, POC    Collection Time: 09/11/22 10:19 PM   Result Value Ref Range    Glucose (POC) 100 70 - 110 mg/dL   EKG, 12 LEAD, SUBSEQUENT    Collection Time: 09/11/22 11:52 PM   Result Value Ref Range    Ventricular Rate 88 BPM    Atrial Rate 133 BPM    QRS Duration 106 ms    Q-T Interval 366 ms    QTC Calculation (Bezet) 442 ms    Calculated R Axis -46 degrees    Calculated T Axis 75 degrees    Diagnosis       Atrial fibrillation  Left axis deviation  Septal infarct , age undetermined  Abnormal ECG  No previous ECGs available     CULTURE, WOUND Carlee Dl STAIN    Collection Time: 09/12/22 12:49 AM    Specimen: Drainage;  Wound   Result Value Ref Range    Special Requests: NO SPECIAL REQUESTS      GRAM STAIN OCCASIONAL WBCS SEEN      GRAM STAIN NO DEFINITE ORGANISM SEEN      Culture result: PENDING    PROTHROMBIN TIME + INR    Collection Time: 09/12/22  4:00 AM   Result Value Ref Range    Prothrombin time 22.5 (H) 11.5 - 15.2 sec    INR 1.9 (H) 0.8 - 1.2     TROPONIN-HIGH SENSITIVITY    Collection Time: 09/12/22  4:29 AM   Result Value Ref Range    Troponin-High Sensitivity 15 0 - 78 ng/L   METABOLIC PANEL, BASIC    Collection Time: 09/12/22  4:29 AM   Result Value Ref Range    Sodium 138 136 - 145 mmol/L    Potassium 4.3 3.5 - 5.5 mmol/L    Chloride 105 100 - 111 mmol/L    CO2 28 21 - 32 mmol/L    Anion gap 5 3.0 - 18 mmol/L    Glucose 118 (H) 74 - 99 mg/dL    BUN 11 7.0 - 18 MG/DL    Creatinine 0.53 (L) 0.6 - 1.3 MG/DL    BUN/Creatinine ratio 21 (H) 12 - 20      GFR est AA >60 >60 ml/min/1.73m2    GFR est non-AA >60 >60 ml/min/1.73m2    Calcium 8.7 8.5 - 10.1 MG/DL   MAGNESIUM    Collection Time: 09/12/22  4:29 AM   Result Value Ref Range    Magnesium 1.7 1.6 - 2.6 mg/dL   CBC WITH AUTOMATED DIFF    Collection Time: 09/12/22  4:29 AM   Result Value Ref Range    WBC 6.0 4.6 - 13.2 K/uL    RBC 3.82 (L) 4.35 - 5.65 M/uL    HGB 12.1 (L) 13.0 - 16.0 g/dL    HCT 37.5 36.0 - 48.0 %    MCV 98.2 78.0 - 100.0 FL    MCH 31.7 24.0 - 34.0 PG    MCHC 32.3 31.0 - 37.0 g/dL    RDW 14.5 11.6 - 14.5 %    PLATELET 821 110 - 440 K/uL    MPV 9.4 9.2 - 11.8 FL    NRBC 0.0 0  WBC    ABSOLUTE NRBC 0.00 0.00 - 0.01 K/uL    NEUTROPHILS 64 40 - 73 %    LYMPHOCYTES 18 (L) 21 - 52 %    MONOCYTES 13 (H) 3 - 10 %    EOSINOPHILS 4 0 - 5 %    BASOPHILS 1 0 - 2 %    IMMATURE GRANULOCYTES 0 0.0 - 0.5 %    ABS. NEUTROPHILS 3.9 1.8 - 8.0 K/UL    ABS. LYMPHOCYTES 1.1 0.9 - 3.6 K/UL    ABS. MONOCYTES 0.8 0.05 - 1.2 K/UL    ABS. EOSINOPHILS 0.3 0.0 - 0.4 K/UL    ABS. BASOPHILS 0.0 0.0 - 0.1 K/UL    ABS. IMM.  GRANS. 0.0 0.00 - 0.04 K/UL    DF AUTOMATED     GLUCOSE, POC    Collection Time: 09/12/22  8:47 AM   Result Value Ref Range    Glucose (POC) 187 (H) 70 - 110 mg/dL   GLUCOSE, POC    Collection Time: 09/12/22 11:19 AM   Result Value Ref Range    Glucose (POC) 223 (H) 70 - 110 mg/dL   ECHO ADULT COMPLETE    Collection Time: 09/12/22  4:08 PM   Result Value Ref Range    IVSd 1.1 (A) 0.6 - 1.0 cm    LVIDd 5.1 4.2 - 5.9 cm    LVIDs 3.4 cm    LVOT Diameter 2.3 cm    LVPWd 0.9 0.6 - 1.0 cm    Global Longitudinal Strain -14.5 %    EF BP 58 55 - 100 %    LV Ejection Fraction A2C 60 %    LV Ejection Fraction A4C 56 %    LV EDV A2C 93 mL    LV EDV A4C 72 mL    LV EDV BP 87 67 - 155 mL    LV ESV A2C 38 mL    LV ESV A4C 32 mL    LV ESV BP 37 22 - 58 mL    LVOT Peak Gradient 4 mmHg    LVOT Mean Gradient 2 mmHg    LVOT SV 67.7 ml    LVOT Peak Velocity 1.0 m/s    LVOT VTI 16.3 cm    RVIDd 3.4 cm    RV Free Wall Peak S' 15 cm/s    LA Diameter 5.4 cm    LA Volume A/L 151 mL    LA Volume 2C 111 (A) 18 - 58 mL    LA Volume 4C 138 (A) 18 - 58 mL    LA Volume  (A) 18 - 58 mL    AV Area by Peak Velocity 2.6 cm2    AV Area by VTI 2.9 cm2    AV Peak Gradient 10 mmHg    AV Mean Gradient 5 mmHg    AV Peak Velocity 1.6 m/s    AV Mean Velocity 1.0 m/s    AV VTI 23.5 cm    MV A Velocity 1.21 m/s    MV E Wave Deceleration Time 145.6 ms    MV E Velocity 0.81 m/s    LV E' Lateral Velocity 15 cm/s    LV E' Septal Velocity 11 cm/s    TAPSE 1.5 (A) 1.7 cm    TR Peak Gradient 47 mmHg    TR Max Velocity 3.44 m/s    Ascending Aorta 3.3 cm    Aortic Root 3.2 cm    Fractional Shortening 2D 33 28 - 44 %    LV ESV Index BP 16 mL/m2    LV EDV Index BP 37 mL/m2    LV ESV Index A4C 14 mL/m2    LV EDV Index A4C 30 mL/m2    LV ESV Index A2C 16 mL/m2    LV EDV Index A2C 39 mL/m2    LVIDd Index 2.15 cm/m2    LVIDs Index 1.43 cm/m2    LV RWT Ratio 0.35     LV Mass 2D 188.0 88 - 224 g    LV Mass 2D Index 79.3 49 - 115 g/m2    MV E/A 0.67     E/E' Ratio (Averaged) 6.38     E/E' Lateral 5.40     E/E' Septal 7.36     LA Volume Index BP 59 (A) 16 - 34 ml/m2    LA Volume Index A/L 64 16 - 34 mL/m2    LVOT Stroke Volume Index 28.6 mL/m2    LVOT Area 4.2 cm2    LA Volume Index 2C 47 (A) 16 - 34 mL/m2    LA Volume Index 4C 58 (A) 16 - 34 mL/m2    LA Size Index 2.28 cm/m2    LA/AO Root Ratio 1.69     Ao Root Index 1.35 cm/m2    Ascending Aorta Index 1.39 cm/m2    AV Velocity Ratio 0.63     LVOT:AV VTI Index 0.69     HUYEN/BSA VTI 1.2 cm2/m2    HUYEN/BSA Peak Velocity 1.1 cm2/m2    Est. RA Pressure 3 mmHg    RVSP 50 mmHg    IVC Proxmal 1.9 cm   GLUCOSE, POC    Collection Time: 09/12/22  4:24 PM   Result Value Ref Range    Glucose (POC) 162 (H) 70 - 110 mg/dL           Assessment/Plan     Principal Problem:    Osteomyelitis of great toe of right foot ([address] (9/11/2022)    Active Problems:    A-fib ([address] (9/11/2022)      Controlled type 2 diabetes mellitus, with long-term current use of insulin ([address] (9/11/2022)      KOLE on CPAP (9/11/2022)        Patient seen and evaluated today. He has osteomyelitis. Recommended amputation of the great toe right foot. Risk, benefits, and alternatives have been discussed at length with the patient and family. Understands risk of continued infection, and loss of limb.     Sekou Amor DPM  September 12, 2022

## 2022-09-13 LAB
ANION GAP SERPL CALC-SCNC: 6 MMOL/L (ref 3–18)
ANION GAP SERPL CALC-SCNC: 7 MMOL/L (ref 3–18)
ATRIAL RATE: 133 BPM
BASOPHILS # BLD: 0 K/UL (ref 0–0.1)
BASOPHILS NFR BLD: 1 % (ref 0–2)
BUN SERPL-MCNC: 10 MG/DL (ref 7–18)
BUN SERPL-MCNC: 10 MG/DL (ref 7–18)
BUN/CREAT SERPL: 13 (ref 12–20)
BUN/CREAT SERPL: 18 (ref 12–20)
CALCIUM SERPL-MCNC: 8.7 MG/DL (ref 8.5–10.1)
CALCIUM SERPL-MCNC: 9 MG/DL (ref 8.5–10.1)
CALCULATED R AXIS, ECG10: -46 DEGREES
CALCULATED T AXIS, ECG11: 75 DEGREES
CHLORIDE SERPL-SCNC: 102 MMOL/L (ref 100–111)
CHLORIDE SERPL-SCNC: 106 MMOL/L (ref 100–111)
CO2 SERPL-SCNC: 26 MMOL/L (ref 21–32)
CO2 SERPL-SCNC: 28 MMOL/L (ref 21–32)
CREAT SERPL-MCNC: 0.57 MG/DL (ref 0.6–1.3)
CREAT SERPL-MCNC: 0.8 MG/DL (ref 0.6–1.3)
DIAGNOSIS, 93000: NORMAL
DIFFERENTIAL METHOD BLD: ABNORMAL
EOSINOPHIL # BLD: 0.2 K/UL (ref 0–0.4)
EOSINOPHIL NFR BLD: 4 % (ref 0–5)
ERYTHROCYTE [DISTWIDTH] IN BLOOD BY AUTOMATED COUNT: 14.2 % (ref 11.6–14.5)
GLUCOSE BLD STRIP.AUTO-MCNC: 180 MG/DL (ref 70–110)
GLUCOSE BLD STRIP.AUTO-MCNC: 194 MG/DL (ref 70–110)
GLUCOSE BLD STRIP.AUTO-MCNC: 197 MG/DL (ref 70–110)
GLUCOSE BLD STRIP.AUTO-MCNC: 207 MG/DL (ref 70–110)
GLUCOSE SERPL-MCNC: 122 MG/DL (ref 74–99)
GLUCOSE SERPL-MCNC: 233 MG/DL (ref 74–99)
HCT VFR BLD AUTO: 35.8 % (ref 36–48)
HGB BLD-MCNC: 11.8 G/DL (ref 13–16)
IMM GRANULOCYTES # BLD AUTO: 0 K/UL (ref 0–0.04)
IMM GRANULOCYTES NFR BLD AUTO: 0 % (ref 0–0.5)
LYMPHOCYTES # BLD: 1.1 K/UL (ref 0.9–3.6)
LYMPHOCYTES NFR BLD: 17 % (ref 21–52)
MAGNESIUM SERPL-MCNC: 1.7 MG/DL (ref 1.6–2.6)
MCH RBC QN AUTO: 31.9 PG (ref 24–34)
MCHC RBC AUTO-ENTMCNC: 33 G/DL (ref 31–37)
MCV RBC AUTO: 96.8 FL (ref 78–100)
MONOCYTES # BLD: 1 K/UL (ref 0.05–1.2)
MONOCYTES NFR BLD: 16 % (ref 3–10)
NEUTS SEG # BLD: 4 K/UL (ref 1.8–8)
NEUTS SEG NFR BLD: 63 % (ref 40–73)
NRBC # BLD: 0 K/UL (ref 0–0.01)
NRBC BLD-RTO: 0 PER 100 WBC
PLATELET # BLD AUTO: 189 K/UL (ref 135–420)
PMV BLD AUTO: 9.4 FL (ref 9.2–11.8)
POTASSIUM SERPL-SCNC: 4 MMOL/L (ref 3.5–5.5)
POTASSIUM SERPL-SCNC: 4.4 MMOL/L (ref 3.5–5.5)
Q-T INTERVAL, ECG07: 366 MS
QRS DURATION, ECG06: 106 MS
QTC CALCULATION (BEZET), ECG08: 442 MS
RBC # BLD AUTO: 3.7 M/UL (ref 4.35–5.65)
SODIUM SERPL-SCNC: 135 MMOL/L (ref 136–145)
SODIUM SERPL-SCNC: 140 MMOL/L (ref 136–145)
VANCOMYCIN SERPL-MCNC: 13.9 UG/ML (ref 5–40)
VENTRICULAR RATE, ECG03: 88 BPM
WBC # BLD AUTO: 6.4 K/UL (ref 4.6–13.2)

## 2022-09-13 PROCEDURE — 74011636637 HC RX REV CODE- 636/637: Performed by: HOSPITALIST

## 2022-09-13 PROCEDURE — 74011000258 HC RX REV CODE- 258: Performed by: INTERNAL MEDICINE

## 2022-09-13 PROCEDURE — 97161 PT EVAL LOW COMPLEX 20 MIN: CPT

## 2022-09-13 PROCEDURE — 80202 ASSAY OF VANCOMYCIN: CPT

## 2022-09-13 PROCEDURE — 74011000250 HC RX REV CODE- 250: Performed by: HOSPITALIST

## 2022-09-13 PROCEDURE — 80048 BASIC METABOLIC PNL TOTAL CA: CPT

## 2022-09-13 PROCEDURE — 36415 COLL VENOUS BLD VENIPUNCTURE: CPT

## 2022-09-13 PROCEDURE — 97535 SELF CARE MNGMENT TRAINING: CPT

## 2022-09-13 PROCEDURE — 82962 GLUCOSE BLOOD TEST: CPT

## 2022-09-13 PROCEDURE — 74011250636 HC RX REV CODE- 250/636: Performed by: INTERNAL MEDICINE

## 2022-09-13 PROCEDURE — 74011250637 HC RX REV CODE- 250/637: Performed by: HOSPITALIST

## 2022-09-13 PROCEDURE — 94660 CPAP INITIATION&MGMT: CPT

## 2022-09-13 PROCEDURE — 97165 OT EVAL LOW COMPLEX 30 MIN: CPT

## 2022-09-13 PROCEDURE — 65270000029 HC RM PRIVATE

## 2022-09-13 PROCEDURE — 83735 ASSAY OF MAGNESIUM: CPT

## 2022-09-13 PROCEDURE — 74011250636 HC RX REV CODE- 250/636: Performed by: PHYSICIAN ASSISTANT

## 2022-09-13 PROCEDURE — 97116 GAIT TRAINING THERAPY: CPT

## 2022-09-13 PROCEDURE — 85025 COMPLETE CBC W/AUTO DIFF WBC: CPT

## 2022-09-13 RX ORDER — VANCOMYCIN 1.75 GRAM/500 ML IN 0.9 % SODIUM CHLORIDE INTRAVENOUS
1750 EVERY 12 HOURS
Status: DISCONTINUED | OUTPATIENT
Start: 2022-09-13 | End: 2022-09-14

## 2022-09-13 RX ORDER — INSULIN GLARGINE 100 [IU]/ML
10 INJECTION, SOLUTION SUBCUTANEOUS
Status: DISCONTINUED | OUTPATIENT
Start: 2022-09-13 | End: 2022-09-14 | Stop reason: HOSPADM

## 2022-09-13 RX ADMIN — Medication 2 UNITS: at 22:00

## 2022-09-13 RX ADMIN — ATORVASTATIN CALCIUM 40 MG: 20 TABLET, FILM COATED ORAL at 17:15

## 2022-09-13 RX ADMIN — VANCOMYCIN HYDROCHLORIDE 1250 MG: 10 INJECTION, POWDER, LYOPHILIZED, FOR SOLUTION INTRAVENOUS at 05:22

## 2022-09-13 RX ADMIN — SPIRONOLACTONE 12.5 MG: 25 TABLET ORAL at 08:20

## 2022-09-13 RX ADMIN — Medication 2 UNITS: at 08:20

## 2022-09-13 RX ADMIN — OXYCODONE AND ACETAMINOPHEN 1 TABLET: 5; 325 TABLET ORAL at 13:08

## 2022-09-13 RX ADMIN — Medication 2 UNITS: at 16:30

## 2022-09-13 RX ADMIN — CEFTRIAXONE 1 G: 1 INJECTION, POWDER, FOR SOLUTION INTRAMUSCULAR; INTRAVENOUS at 11:53

## 2022-09-13 RX ADMIN — Medication 10 UNITS: at 22:00

## 2022-09-13 RX ADMIN — VANCOMYCIN HYDROCHLORIDE 1750 MG: 10 INJECTION, POWDER, LYOPHILIZED, FOR SOLUTION INTRAVENOUS at 17:23

## 2022-09-13 RX ADMIN — SODIUM CHLORIDE, PRESERVATIVE FREE 10 ML: 5 INJECTION INTRAVENOUS at 07:06

## 2022-09-13 RX ADMIN — SODIUM CHLORIDE, PRESERVATIVE FREE 10 ML: 5 INJECTION INTRAVENOUS at 22:19

## 2022-09-13 RX ADMIN — Medication 4 UNITS: at 11:52

## 2022-09-13 RX ADMIN — MONTELUKAST 10 MG: 10 TABLET, FILM COATED ORAL at 17:15

## 2022-09-13 RX ADMIN — LOSARTAN POTASSIUM 50 MG: 50 TABLET, FILM COATED ORAL at 08:20

## 2022-09-13 NOTE — PROGRESS NOTES
Reason for Admission:  Foot pain                     RUR Score:    low; 10%                 Plan for utilizing home health:      HH vs none    PCP: First and Last name:  Carla Almanza DO     Name of Practice:    Are you a current patient: Yes/No:    Approximate date of last visit:    Can you participate in a virtual visit with your PCP:                     Current Advanced Directive/Advance Care Plan: Full Code      Healthcare Decision Maker:   Click here to complete [address] including selection of the Healthcare Decision Maker Relationship (ie \"Primary\")                             Transition of Care Plan:      Home with physician follow up vs [facility] with physician follow up                 Chart reviewed. Per H&P \"Adam Lopes is a 67 y.o. male with hypertension, A. fib on warfarin,  DM, scotty on cpap was sent to ER per Dr. José Miguel Chapin podiatrist due to left toe OM. He has left toe lesion for 2 to 3 weeks. He was giving p.o. antibiotics, his foot swelling did not got improving. He was sent to see Dr. José Miguel Chapin. He got  recent MRI done last Friday, he was called to go to ER for further evaluation and treatment because of osteomyelitis of the toe from MRI report. Denies any slurred speech/headache/cp/n/v/blurred vission/d/c/palpitation/gait change/bleeding. Denies smoking/ any alcohol or drug use. He uses cpap at night. \"    CM and provider met with pt at bedside to discuss care transition. Pt is  and has been independent. Pt has access to a RW and a cane. Pt also uses a cpap at home. Pt with planned surgical intervention (amputation of the great toe right foot). Noted pt is being followed by podiatry and ID. Please consider ordering therapy services following surgical intervention when appropriate to assist with care transition. CM to continue to follow and assist with care transition. Care Management Interventions  Mode of Transport at Discharge:  Other (see comment) (Family)  Transition of Care Consult (CM Consult): Discharge Planning  Health Maintenance Reviewed: Yes  Support Systems: Spouse/Significant Other  Confirm Follow Up Transport: Self  The Plan for Transition of Care is Related to the Following Treatment Goals : Home with physician follow up vs [facility] with physician follow up  Discharge Location  Patient Expects to be Discharged to[de-identified] Home with family assistance (vs )

## 2022-09-13 NOTE — PROGRESS NOTES
Clarified weight bearing status with Dr. Farzana Galan. Patient to be bedrest with [facility] privileges and NWB to R Foot tonight. Post op shoe at patients bedside.

## 2022-09-13 NOTE — PROGRESS NOTES
AMD - Not Interested   addressed Advance Medical Directives with the patient. Patient is not interested at this time.  completed visit with patient and offered Pastoral care. Chaplains will continue to follow and will provide pastoral care as needed or requested.        Sister Trevor Rae [place], [address]  [phone]

## 2022-09-13 NOTE — DIABETES MGMT
Diabetes/ Glycemic Control Plan of Care  Recommendations:     Recommend initiating Lantus - 10 units every 24 hours    Assessment: 67 y.o. male with known h/o T2DM, A1c not within recommended range for age/comorbidities. Spoke with patient at bedside - verified home DM medication regimen:  Metformin XR daily with dinner + 70/30 (vial & syringe) 70 units before breakfast + 60 units before dinner. Patient states he \"rarely\" experiences hypoglycemia at home. BG 63 mg/dL on admission - steadily elevating since, 122-223 mg/dL over the past 24 hours with a total of 12 units corrective Humalog administered.  this AM.   Patient would benefit from a starting dose of basal.    DX:   1. Osteomyelitis of great toe of right foot (HCC)           Fasting/ Morning blood glucose:   Lab Results   Component Value Date/Time    Glucose 122 (H) 09/13/2022 01:40 AM    Glucose (POC) 207 (H) 09/13/2022 11:00 AM     IV Fluids containing dextrose: none  Steroids:  none    Blood glucose values: Within target range (70-180mg/dL):  NO  Current insulin orders:    Corrective Humalog ACHS - normal sensitivity scale  Total Daily Dose previous 24 hours = 12 units    Current A1c:   Lab Results   Component Value Date/Time    Hemoglobin A1c 7.1 (H) 09/11/2022 01:45 PM      equivalent  to ave Blood Glucose of 157 mg/dl for 2-3 months prior to admission  Adequate glycemic control PTA: YES    Nutrition/Diet:   Patient reports poor appetite at present - states he hasn't been eating much since admission. Active Orders   Diet    ADULT DIET Regular; 4 carb choices (60 gm/meal)      Home diabetes medications:   Key Antihyperglycemic Medications               metFORMIN ER (GLUCOPHAGE XR) 750 mg tablet (Taking) Take 750 mg by mouth Daily (before dinner). Two tablets    insulin NPH/insulin regular (NOVOLIN 70/30, HUMULIN 70/30) 100 unit/mL (70-30) injection 60-70 Units by SubCUTAneous route Before breakfast and dinner.  70 units with breakfast + 60 units with dinner  Indications: type 2 diabetes mellitus          Plan/Goals:   Blood glucose will be within target of 70 - 180 mg/dl within 72 hours  Reinforce dietary and medication compliance at home.        Education:  [x] Refer to Diabetes Education Record                       [] Education not indicated at this time       Tony Martínez RN, BSN, [facility]  Professional   Glycemic Control Team   Phone:  [phone]  Tues - Thurs 8:30 - 4:30

## 2022-09-13 NOTE — PROGRESS NOTES
conducted an initial consultation and Spiritual Assessment for Lakeshia Gorman, who is a 67 y. o.,male. Patients Primary Language is: [place]. According to the patients EMR Shinto Affiliation is: No Moravian. Addressed AMD but patient is not interested at all. He states  that he lives with his spouse and son and that they will take care of everything. The reason the Patient came to the hospital is:   Patient Active Problem List    Diagnosis Date Noted    Osteomyelitis of great toe of right foot ([facility] [address] 09/11/2022    A-fib ([address] 09/11/2022    Controlled type 2 diabetes mellitus, with long-term current use of insulin ([facility] [address] 09/11/2022    KOLE on CPAP 09/11/2022       The  provided the following Interventions:  Initiated a relationship of care and support. Explored issues of mary ellen, belief, spirituality and Voodoo/ritual needs while hospitalized. Listened empathically. Provided chaplaincy education. Provided information about Spiritual Care Services. Offered prayer and assurance of continued prayers on patients behalf. Chart reviewed. The following outcomes were achieved:  Patient shared limited information about both their medical narrative and spiritual journey/beliefs. Patient processed feeling about current hospitalization. Patient expressed gratitude for pastoral care visit. Assessment:  Patient does not have any Voodoo/cultural needs that will affect patients preferences in health care. There are no further spiritual or Voodoo issues which require intervention at this time. Plan:  Chaplains will continue to follow and will provide pastoral care on an as needed/requested basis.  recommends bedside caregivers page  on duty if patient shows signs of acute spiritual or emotional distress.       Sister MartJavid Moseley, [address]  [phone]

## 2022-09-13 NOTE — PROGRESS NOTES
Problem: Self Care Deficits Care Plan (Adult)  Goal: *Acute Goals and Plan of Care (Insert Text)  Description: Occupational Therapy Goals  Initiated 9/13/2022 within 7 day(s). 1.  Patient will perform grooming with modified independence standing at sink for 5 minutes or more. 2.  Patient will perform upper body dressing with independence. 3.  Patient will perform lower body dressing with modified independence. 4.  Patient will perform toilet transfers with modified independence. 5.  Patient will perform all aspects of toileting with modified independence. 6.  Patient will participate in upper extremity therapeutic exercise/activities with independence for 10 minutes. 7.  Patient will utilize energy conservation techniques during functional activities with verbal, visual, and tactile cues. OCCUPATIONAL THERAPY EVALUATION    Patient: Chel Arambula (17 y.o. male)  Date: 9/13/2022  Primary Diagnosis: Osteomyelitis of great toe of right foot (HCC) [M86.9]  Procedure(s) (LRB):  AMPUTATION OF RIGHT GREAT TOE (Right) 1 Day Post-Op   Precautions:   Fall, PWB (heel wb with post-op shoe)  PLOF: independent with ADLs and transfers     ASSESSMENT :  Based on the objective data described below, the patient presents with decreased strength, endurance and balance for carryover of ADLs and transfers following above mentioned surgical procedure. Pt presented supine in bed at the beginning of session and agrees to participate with therapy. Pt performed bed mobility, supine<>sit, sit<>stand transfers, toilet transfers, and LB dressing with CG-SBA during this session. Pt verbalized understanding for the PWB at R foot with the surgical shoe on and is compliant ~90% of the time for WB with functional transfers. Pt was left supine in bed at the end of session in NAD. Patient will benefit from skilled intervention to address the above impairments.   Patient's rehabilitation potential is considered to be Good  Factors which may influence rehabilitation potential include:   [x]             None noted  []             Mental ability/status  []             Medical condition  []             Home/family situation and support systems  []             Safety awareness  []             Pain tolerance/management  []             Other:      PLAN :  Recommendations and Planned Interventions:   [x]               Self Care Training                  [x]      Therapeutic Activities  [x]               Functional Mobility Training   []      Cognitive Retraining  [x]               Therapeutic Exercises           [x]      Endurance Activities  [x]               Balance Training                    []      Neuromuscular Re-Education  []               Visual/Perceptual Training     [x]      Home Safety Training  [x]               Patient Education                   [x]      Family Training/Education  []               Other (comment):    Frequency/Duration: Patient will be followed by occupational therapy 3 times a week to address goals. Discharge Recommendations: Home Health  Further Equipment Recommendations for Discharge: N/A    AMPAC: Based on an AM-PAC score of 19/24 and their current ADL deficits; it is recommended that the patient have 2-3 sessions per week of Occupational Therapy at d/c to increase the patient's independence. This AMPAC score should be considered in conjunction with interdisciplinary team recommendations to determine the most appropriate discharge setting. Patient's social support, diagnosis, medical stability, and prior level of function should also be taken into consideration. SUBJECTIVE:   Patient stated I am doing alright.     OBJECTIVE DATA SUMMARY:   History reviewed. No pertinent past medical history. History reviewed. No pertinent surgical history.   Barriers to Learning/Limitations: None  Compensate with: visual, verbal, tactile, kinesthetic cues/model    Home Situation:   Home Situation  Home Environment: Private residence  # Steps to Enter: 4  Rails to Enter: Yes  [employer] : Right  One/Two Story Residence: Two story, live on 1st floor  Living Alone: No  Support Systems: Spouse/Significant Other, Child(johny)  Patient Expects to be Discharged to[de-identified] Home with home health  Current DME Used/Available at Home: Cane, straight, Walker  Tub or Shower Type: Shower  []  Right hand dominant   []  Left hand dominant    Cognitive/Behavioral Status:  Neurologic State: Alert  Orientation Level: Oriented X4  Cognition: Appropriate for age attention/concentration; Follows commands  Safety/Judgement: Fall prevention    Skin: intact  Edema: none    Vision/Perceptual:    Tracking: Able to track stimulus in all quadrants w/o difficulty    Coordination: BUE  Coordination: Within functional limits  Fine Motor Skills-Upper: Left Intact; Right Intact    Gross Motor Skills-Upper: Left Intact; Right Intact  Balance:  Sitting: Intact  Standing: Intact; With support  Strength: BUE  Strength: Generally decreased, functional  Tone & Sensation: BUE  Tone: Normal  Sensation: Intact  Range of Motion: BUE  AROM: Generally decreased, functional  Functional Mobility and Transfers for ADLs:  Bed Mobility:  Supine to Sit: Supervision  Sit to Supine: Supervision  Scooting: Supervision  Transfers:  Sit to Stand: Contact guard assistance;Stand-by assistance  Stand to Sit: Stand-by assistance   Toilet Transfer : Stand-by assistance  ADL Assessment:   Feeding: Independent    Oral Facial Hygiene/Grooming: Stand-by assistance    Upper Body Dressing: Supervision    Lower Body Dressing: Contact guard assistance    Toileting: Stand by assistance  ADL Intervention:  Grooming  Grooming Assistance: Stand-by assistance  Position Performed: Standing  Washing Hands: Stand-by assistance    Lower Body Dressing Assistance  Dressing Assistance: Stand-by assistance  Socks: Stand-by assistance  Leg Crossed Method Used: No  Position Performed: Long sitting on bed  Cues: Shayy Genera    Cognitive Retraining  Safety/Judgement: Fall prevention  Pain:  Pain level pre-treatment: 2/10   Pain level post-treatment: 2/10   Pain Intervention(s): Medication (see MAR); Rest, Ice, Repositioning   Response to intervention: Nurse notified, See doc flow    Activity Tolerance:   Good     Please refer to the flowsheet for vital signs taken during this treatment. After treatment:   [] Patient left in no apparent distress sitting up in chair  [x] Patient left in no apparent distress in bed  [x] Call bell left within reach  [x] Nursing notified  [] Caregiver present  [] Bed alarm activated    COMMUNICATION/EDUCATION:   [x] Role of Occupational Therapy in the acute care setting  [x] Home safety education was provided and the patient/caregiver indicated understanding. [x] Patient/family have participated as able in goal setting and plan of care. [x] Patient/family agree to work toward stated goals and plan of care. [] Patient understands intent and goals of therapy, but is neutral about his/her participation. [] Patient is unable to participate in goal setting and plan of care. Thank you for this referral.  Azucena Meek, OTR/L  Time Calculation: 25 mins    Eval Complexity: History: LOW Complexity : Brief history review ; Examination: LOW Complexity : 1-3 performance deficits relating to physical, cognitive , or psychosocial skils that result in activity limitations and / or participation restrictions ; Decision Making:MEDIUM Complexity : Patient may present with comorbidities that affect occupational performnce.  Miniml to moderate modification of tasks or assistance (eg, physical or verbal ) with assesment(s) is necessary to enable patient to complete evaluation     [address] [facility] [address] [address] AM-PAC® Daily Activity Inpatient Short Form (6-Clicks)*    How much HELP from another person does the patient currently need    (If the patient hasn't done an activity recently, how much help from another person do you think he/she would need if he/she tried?)   Total (Total A or Dep)   A Lot  (Mod to Max A)   A Little (Sup or Min A)   None (Mod I to I)   Putting on and taking off regular lower body clothing? [] 1 [] 2 [x] 3 [] 4   2. Bathing (including washing, rinsing,      drying)? [] 1 [] 2 [x] 3 [] 4   3. Toileting, which includes using toilet, bedpan or urinal?   [] 1 [] 2 [x] 3 [] 4   4. Putting on and taking off regular upper body clothing? [] 1 [] 2 [x] 3 [] 4   5. Taking care of personal grooming such as brushing teeth? [] 1 [] 2 [x] 3 [] 4   6. Eating meals? [] 1 [] 2 [] 3 [x] 4   Based on an AM-PAC score of 19/24 and their current ADL deficits; it is recommended that the patient have 2-3 sessions per week of Occupational Therapy at d/c to increase the patient's independence.

## 2022-09-13 NOTE — PROGRESS NOTES
Uneventful shift. Patient denied pain or discomfort. Chronic numbness to feet. Dressing to right foot CDI. Vital signs stable, afebrile. 0730 - Bedside and Verbal shift change report given to TORRES Galindo RN (oncoming nurse) by Chris Oro (offgoing nurse). Report included the following information SBAR, Kardex, Intake/Output, and MAR.

## 2022-09-13 NOTE — OP NOTES
[facility]  OPERATIVE REPORT    Name:  Freddie Mills  MR#:   [id number]  :  1950  ACCOUNT #:  [de-identified]  DATE OF SERVICE:  2022    PREOPERATIVE DIAGNOSES:  1.  Osteomyelitis, right great toe.  2.  Ulceration, right great toe. 3.  Cellulitis, right great toe. POSTOPERATIVE DIAGNOSES:  1.  Osteomyelitis, right great toe.  2.  Ulceration, right great toe. 3.  Cellulitis, right great toe. PROCEDURE PERFORMED:  1. Excision of open ulceration, right foot. 2.  Incision to bone cortex, distal phalanx, right foot. 3.  Amputation of great toe with metatarsal head resection, right foot. 4.  Soft tissue rearrangement, less than 20 cm2, right foot. SURGEON:  Sera Mancilla DPM.    ASSISTANT:  None. ANESTHESIA:  Local.    COMPLICATIONS:  None. SPECIMENS REMOVED:  Toe. IMPLANTS:  None. ESTIMATED BLOOD LOSS:  Minimal.    INDICATIONS:  This is a very unfortunate 75-year-old male, who sustained a significant infection of the right great toe. In my office, I diagnosed him with a penetrating ulceration, which communicated dorsal-to-plantar. This ultimately led to soft tissue deficit and devascularization of the distal aspect of the foot. MRI as an outpatient indicated presence of osteomyelitis. I have decided to move forward with surgical intervention given the above findings. Complications include delayed healing, nonhealing, continued pain, and discomfort. No guarantees were given. Informed consent was obtained. PREPARATION AND PROCEDURE:  The patient was taken to the operating room and placed on the table in a supine position. After the induction of a local anesthetic consisting of 19 mL of 0.5% Marcaine plain, the right foot was prepped and draped in the usual sterile manner. PROCEDURE #1:  Excision of ulcer, right foot. At this point, the devitalized tissue in the distal aspect of the right foot was completely excised.   We used a 15-blade down to the level of the underlying subcutaneous tissue. All necrotic and nonviable tissue were removed in its entirety. I used a 15-blade to excise all of this, which left a fairly large deficit behind. PROCEDURE #2:  Incision to bone cortex, right foot. At this point, I incised the distal bone cortex and noted significant necrotic soft tissue and bone. The bone was sent to Pathology for gross microscopic examination. PROCEDURE #3:  Amputation of the great toe and metatarsal, right foot. At this point, I made a decision to move forward with amputation and disarticulation of the right great toe as well as the metatarsal head. A circumferential incision was made around the area removing all devitalized bone and soft tissue including the distal aspect of the metatarsal head. Significant bleeding was noted which was then controlled with direct pressure, Surgifoam, and topical thrombin. Once this was done, I deflated the tourniquet and inspected for any bleeders. There were none. There was still some excessive oozing, so I applied the remaining portion of the topical thrombin. I was satisfied with the hemostasis at this point. PROCEDURE #4:  Soft tissue rearrangement, less than 20 cm2. At this point, there was very little soft tissue coverage. I extended the incision proximally and distally and rotated the medial aspect of the remaining skin laterally so as to obtain appropriate coverage overlying the remaining portion of the metatarsal.  There was good  bleeding margins noted at the distal aspect of the skin. No further open wound was identified. I had to free some of the soft tissue plantarly and laterally as well rotating the lateral flap medially so as to gain some tissue coverage at the plantar lateral aspect of the first metatarsal head. Overall, good perfusion was noted and the remaining incision was repaired with the use of 3-0 nylon in interrupted horizontal mattress fashion.   The rotated portion of the skin from the plantar lateral aspect was repaired with simple sutures. Surgical site was then dressed with Xeroform, 4 x 4's, Ana, and a well-padded Ace bandage dressing. The patient was transferred from the OR to the PACU with vital signs stable and vascular status intact as indicated by capillary refill in digits 1 through 5, right foot.       Jennie Kelley DPM      [id number]  D:  09/12/2022 18:23  T:  09/12/2022 22:51  JOB #:  [id number]

## 2022-09-13 NOTE — PROGRESS NOTES
Hospitalist Progress Note    Patient: Nixon Cyr MRN: [id number]  CSN: [id number]    YOB: 1950  Age: 67 y.o. Sex: male    DOA: 9/11/2022 LOS:  LOS: 2 days                Assessment/Plan     Patient Active Problem List   Diagnosis Code    Osteomyelitis of great toe of right foot ([facility] [address] M86.9    A-fib ([facility] [address] I48.91    Controlled type 2 diabetes mellitus, with long-term current use of insulin (HCC) E11.9, Z79.4    KOLE on CPAP G47.33, Z99.89        Chief complaint :  Left foot pain and redness  67 y.o. male with hypertension, A. fib on warfarin,  DM, kole on cpap was sent to ER per Dr. Magdaleno Cruz podiatrist due to left toe OM. Osteomyelitis of left toe -  Local wound care  S/p surgery  ID following  Continue IV antibiotics     A. fib on warfarin -  Continue monitoring,   Seen by cardiology  EKG, troponin, echo  Hold warfarin for now for the procedure  Daily INR  Restart once ok from podiatry. KOLE -  On CPAP we will continue     Hypertension -  Continue home medication        DM type II , with complication -  On Lantus. uses 7030 60/70 twice a day at home,  Ssi also        Disposition : 1-2 days    Review of systems  General: No fevers or chills. Cardiovascular: No chest pain or pressure. No palpitations. Pulmonary: No shortness of breath. Gastrointestinal: No nausea, vomiting. Physical Exam:  General: Awake, cooperative, no acute distress    HEENT: NC, Atraumatic. PERRLA, anicteric sclerae. Lungs: CTA Bilaterally. No Wheezing/Rhonchi/Rales. Heart:  S1 S2,  No murmur, No Rubs, No Gallops  Abdomen: Soft, Non distended, Non tender. +Bowel sounds,   Extremities: Left toe swelling and redness. Psych:   Not anxious or agitated. Neurologic:  No acute neurological deficit.          Vital signs/Intake and Output:  Visit Vitals  /75   Pulse 81   Temp 97.6 °F (36.4 °C)   Resp 18   Ht 5' 8\" (1.727 m)   Wt 127.5 kg (281 lb)   SpO2 93%   BMI 42.73 kg/m²     Current Shift:  09/13 0701 - 09/13 1900  In: 250 [I.V.:250]  Out: -   Last three shifts:  No intake/output data recorded. Labs: Results:       Chemistry Recent Labs     09/13/22  1430 09/13/22  0140 09/12/22  0429 09/11/22  1345   * 122* 118* 79   * 140 138 140   K 4.0 4.4 4.3 4.5    106 105 108   CO2 26 28 28 28   BUN 10 10 11 15   CREA 0.80 0.57* 0.53* 0.65   CA 9.0 8.7 8.7 9.0   AGAP 7 6 5 4   BUCR 13 18 21* 23*   AP  --   --   --  71   TP  --   --   --  7.0   ALB  --   --   --  3.3*   GLOB  --   --   --  3.7   AGRAT  --   --   --  0.9      CBC w/Diff Recent Labs     09/13/22  0140 09/12/22 0429 09/11/22  1345   WBC 6.4 6.0 6.5   RBC 3.70* 3.82* 4.00*   HGB 11.8* 12.1* 12.9*   HCT 35.8* 37.5 39.7    199 218   GRANS 63 64 69   LYMPH 17* 18* 14*   EOS 4 4 4      Cardiac Enzymes No results for input(s): CPK, CKND1, YAZ in the last 72 hours. No lab exists for component: CKRMB, TROIP   Coagulation Recent Labs     09/12/22  0400 09/11/22  1345   PTP 22.5* 25.5*   INR 1.9* 2.3*       Lipid Panel No results found for: CHOL, CHOLPOCT, CHOLX, CHLST, CHOLV, 056546, HDL, HDLP, LDL, LDLC, DLDLP, 842227, VLDLC, VLDL, TGLX, TRIGL, TRIGP, TGLPOCT, CHHD, CHHDX   BNP No results for input(s): BNPP in the last 72 hours.    Liver Enzymes Recent Labs     09/11/22  1345   TP 7.0   ALB 3.3*   AP 71      Thyroid Studies No results found for: T4, T3U, TSH, TSHEXT, TSHEXT     Procedures/imaging: see electronic medical records for all procedures/Xrays and details which were not copied into this note but were reviewed prior to creation of Plan

## 2022-09-13 NOTE — PROGRESS NOTES
.[address] [facility] Pharmacokinetic Monitoring Service - Vancomycin    Consulting Provider: Jan Schulte   Indication: Bone and Joint Infection  Target Concentration: Goal AUC/DEDE 400-600 mg*hr/L  Day of Therapy: 3  Additional Antimicrobials: cefepime, rocephin    Pertinent Laboratory Values: Wt Readings from Last 1 Encounters:   09/12/22 127.5 kg (281 lb)     Temp Readings from Last 1 Encounters:   09/13/22 98.2 °F (36.8 °C)     No components found for: PROCAL  Estimated Creatinine Clearance: 124.1 mL/min (A) (by C-G formula based on SCr of 0.57 mg/dL (L)).   Recent Labs     09/13/22  0140 09/12/22  0429   WBC 6.4 6.0       Pertinent Cultures:  Culture Date Source Results   9/13/2022 Blood No growth       Assessment:  Date/Time Current Dose Concentration Timing of Concentration (h) AUC   09/13/2022 1250 mg 13.9 8 h 16 min 349   Note: Serum concentrations collected for AUC dosing may appear elevated if collected in close proximity to the dose administered, this is not necessarily an indication of toxicity    Plan:  Current dosing regimen is sub-therapeutic  Increase dose to Vancomycin 1750 mg IV Q 12 hours  Repeat vancomycin concentration when appropriate   Pharmacy will continue to monitor patient and adjust therapy as indicated    Thank you for the consult,  MIGUEL ANGEL [facility], [place]  9/13/2022 11:18 AM

## 2022-09-13 NOTE — PROGRESS NOTES
Clarified weight bearing status with Dr. Stephanie Matute. Patient to be partial weight bearing status with post-op shoe and walker.

## 2022-09-13 NOTE — PROGRESS NOTES
Problem: Mobility Impaired (Adult and Pediatric)  Goal: *Acute Goals and Plan of Care (Insert Text)  Description: Physical Therapy Goals  Initiated 9/13/2022 and to be accomplished within 7 day(s)  1. Patient will move from supine to sit and sit to supine  in bed with independence. 2.  Patient will transfer from bed to chair and chair to bed with supervision/set-up using the least restrictive device. 3.  Patient will perform sit to stand with supervision/set-up. 4.  Patient will ambulate with supervision/set-up for 50 feet with the least restrictive device. 5.  Patient will ascend/descend 4 stairs with handrail(s) with minimal assistance/contact guard assist.     Note:   PHYSICAL THERAPY EVALUATION    Patient: August Bound (60 y.o. male)  Date: 9/13/2022  Primary Diagnosis: Osteomyelitis of great toe of right foot (HCC) [M86.9]  Procedure(s) (LRB):  AMPUTATION OF RIGHT GREAT TOE (Right) 1 Day Post-Op   Precautions:   Fall, PWB (through heel, with post-op shoe)    ASSESSMENT :  Based on the objective data described below, the patient presents with lower extremity weakness, decreased gait quality and endurance, and overall limitations in functional mobility. Post-op shoe in place. Education provided to patient on PWB, through heel and importance of using RW. Pt performed supine to sit with supervision, sit to stand with SBA. Patient ambulated 15 feet with RW, GB applied, CGA. Pt tolerated session fairly as evidenced by no c/o increased pain, no lightheadedness. Pt minimally receptive to education. Will continue to reinforce. Pt able to maintain PWB during ambulation. Pt returned to supine with LE elevated. Will continue to progress as pt tolerates. Patient will benefit from skilled intervention to address the above impairments.   Patient's rehabilitation potential is considered to be Good  Factors which may influence rehabilitation potential include:   []         None noted  []         Mental ability/status  []         Medical condition  []         Home/family situation and support systems  []         Safety awareness  []         Pain tolerance/management  []         Other:      PLAN :  Recommendations and Planned Interventions:   [x]           Bed Mobility Training             []    Neuromuscular Re-Education  [x]           Transfer Training                   []    Orthotic/Prosthetic Training  [x]           Gait Training                          [x]    Modalities  [x]           Therapeutic Exercises           []    Edema Management/Control  [x]           Therapeutic Activities            [x]    Family Training/Education  [x]           Patient Education  []           Other (comment):    Frequency/Duration: Patient will be followed by physical therapy 1-2 times per day/4-7 days per week to address goals. Discharge Recommendations: Home Health  Further Equipment Recommendations for Discharge: N/A    AMPAC: 15/20    This AMPAC score should be considered in conjunction with interdisciplinary team recommendations to determine the most appropriate discharge setting. Patient's social support, diagnosis, medical stability, and prior level of function should also be taken into consideration. SUBJECTIVE:   Patient stated I am .    OBJECTIVE DATA SUMMARY:   History reviewed. No pertinent past medical history. History reviewed. No pertinent surgical history.   Barriers to Learning/Limitations: None  Compensate with: Visual Cues and Verbal Cues  Home Situation:  Home Situation  Home Environment: Private residence  # Steps to Enter: 4  Rails to Enter: Yes  Hand Rails : Bilateral  One/Two Story Residence: Two story, live on 1st floor  Living Alone: No  Support Systems: Spouse/Significant Other, Child(johny)  Patient Expects to be Discharged to[de-identified] Home with home health  Critical Behavior:  Psychosocial  Purposeful Interaction: Yes  Pt Identified Daily Priority: Clinical issues (comment)  Caritas Process: Establish trust;Teaching/learning; Attend basic human needs;Create healing environment  Caring Interventions: Reassure  Skin Condition/Temp: Inflamed;Peeling   Skin Integrity: Incision (comment)  Skin Integumentary  Skin Color: Pink  Skin Condition/Temp: Inflamed;Peeling  Skin Integrity: Incision (comment)   Strength:    Strength: Generally decreased, functional  Tone & Sensation:   Tone: Normal  Sensation: Impaired  Range Of Motion:  AROM: Generally decreased, functional  Functional Mobility:  Bed Mobility:   Supine to Sit: Supervision  Sit to Supine: Supervision   Transfers:  Sit to Stand: Stand-by assistance  Stand to Sit: Stand-by assistance  Balance:   Sitting: Intact  Standing: Intact; With support  Ambulation/Gait Training:  Distance (ft): 15 Feet (ft)  Assistive Device: Gait belt;Walker, rolling  Ambulation - Level of Assistance: Contact guard assistance   Gait Description (WDL): Exceptions to WDL  Gait Abnormalities: Decreased step clearance; Antalgic; Step to gait  Right Side Weight Bearing: Partial (%) (heel, with post-op shoe)   Base of Support: Shift to left  Stance: Right decreased; Left increased  Speed/Carina: Slow  Step Length: Right shortened;Left shortened  Pain:  Pain level pre-treatment: 3/10   Pain level post-treatment: 3/10     Activity Tolerance:   Fair  Please refer to the flowsheet for vital signs taken during this treatment. After treatment:   []         Patient left in no apparent distress sitting up in chair  [x]         Patient left in no apparent distress in bed  [x]         Call bell left within reach  [x]         Nursing notified  []         Caregiver present  []         Bed alarm activated  []         SCDs applied    COMMUNICATION/EDUCATION:   [x]         Role of Physical Therapy in the acute care setting. [x]         Fall prevention education was provided and the patient/caregiver indicated understanding.   [x]         Patient/family have participated as able in goal setting and plan of care.  []         Patient/family agree to work toward stated goals and plan of care. []         Patient understands intent and goals of therapy, but is neutral about his/her participation. []         Patient is unable to participate in goal setting/plan of care: ongoing with therapy staff.  []         Other: Thank you for this referral.  Maria Alejandra Menezesgabobeverley   Time Calculation: 23 mins      Eval Complexity: History: MEDIUM  Complexity : 1-2 comorbidities / personal factors will impact the outcome/ POC Exam:LOW Complexity : 1-2 Standardized tests and measures addressing body structure, function, activity limitation and / or participation in recreation  Presentation: LOW Complexity : Stable, uncomplicated  Clinical Decision Making:Low Complexity    Overall Complexity:LOW     Rosa Maria Triplett AM-PAC® Basic Mobility Inpatient Short Form (6-Clicks) Version 2    How much HELP from another person does the patient currently need    (If the patient hasn't done an activity recently, how much help from another person do you think he/she would need if he/she tried?)   Total (Total A or Dep)   A Lot  (Mod to Max A)   A Little (Sup or Min A)   None (Mod I to I)   Turning from your back to your side while in a flat bed without using bedrails? [] 1 [] 2 [x] 3 [] 4   2. Moving from lying on your back to sitting on the side of a flat bed without using bedrails? [] 1 [] 2 [x] 3 [] 4   3. Moving to and from a bed to a chair (including a wheelchair)? [] 1 [] 2 [x] 3 [] 4   4. Standing up from a chair using your arms (e.g., wheelchair, or bedside chair)? [] 1 [] 2 [x] 3 [] 4   5. Walking in hospital room? [] 1 [] 2 [x] 3 [] 4   6. Climbing 3-5 steps with a railing?+   [] 1 [] 2 [] 3 [] 4   +If stair climbing cannot be assessed, skip item #6. Sum responses from items 1-5.    Based on an AM-PAC score of 15/20 and their current functional mobility deficits, it is recommended that the patient have 2-3 sessions per week of Physical Therapy at d/c to increase the patient's independence.

## 2022-09-13 NOTE — PROGRESS NOTES
[facility]  (A Division of [address] [place] [address])    Jamie Sanz MD  Office #: - Option # [phone]  Fax #: [phone]     Date of Admission: 9/11/2022      Date of Note: 9/13/2022   Reason for Referral: Evaluation and antibiotic management of foot infection/ OM     Current Antimicrobials:    Prior Antimicrobials:  Cefepime -> ceftriaxone and vancomycin   Oral ABX PTA   Antibiotic allergy: Bactrim ? Assessment:     R hallux OM with abscess - proximal and distal phalanx on MRI 9/8/22  Blood culture 9/11: NGSF  Wound culture 9/12- GS/culture- pending  S/P   DM reasonable control 0 A1C 7.1%  A.fib on AC-Coumadin  Morbidly obese BMI 42    Recommendation -- ID related:     Cont current aBX  R hallux ampuation including proximal MT noted-- likely surgical cure. Dr Crissy Fried to advise if need to wait for biopsy result or can safely DC abx  FU cutlures for now      Will follow. CHRISTA RN     Subjective:     Pt sleeping, didn't wake him. No F/C/R-- no issue with N/V per nursing  Notes/Labs reviewed    HPI:     Will Domingo is a 67 y.o. WHITE/NON- with PMH of DM, A.fib on AC- Coumadin,R great toe OM-- he had onset of R hallux infection from 2 weeks ago. Onset of pain and swelling- without trauma 2 weeks ago-- seen in [facility]- 8/24--I and D done and given Augmentin and Bactrim. Sent to wound clinic per Care Everywhere review-- on 48 hour FU. Seen by PCP, who referred him to Podiatry. MRI done on 9/8 showing OM and abscess, sent in to ED for admission. Per patient, he doesn't recall any allergic reaction to Bactrim, however, he has it listed as allergy. Currently, tolerating Vanco and cefepime so far. Tells me that OR for toe resection in  plans by Dr Crissy Fried. No F/C/R/SOB/ chest pain. No urinary issue. Chart reviewed on prior notes. Care Everywhere reviewed- in S system.    Imaging repots reviewed- in [facility] system    Osteomyelitis of great toe of right foot ([address] [M86.9]  History reviewed. No pertinent surgical history. No family history on file.   Medications reviewed as below:   Current Facility-Administered Medications   Medication Dose Route Frequency Provider Last Rate Last Admin    vancomycin (VANCOCIN) 1750 mg in  ml infusion  1,750 mg IntraVENous Q12H Ana Solorio PA        cefTRIAXone (ROCEPHIN) 1 g in 0.9% sodium chloride (MBP/ADV) 50 mL MBP  1 g IntraVENous Q24H Jayda Caicedo  mL/hr at 09/13/22 1153 1 g at 09/13/22 1153    Vancomycin Pharmacy Dosing   1 Each Other Rx Dosing/Monitoring LEVI Rose        sodium chloride (NS) flush 5-40 mL  5-40 mL IntraVENous Q8H Lilia Shah MD   10 mL at 09/13/22 0706    sodium chloride (NS) flush 5-40 mL  5-40 mL IntraVENous PRN Lilia Shah MD        acetaminophen (TYLENOL) tablet 650 mg  650 mg Oral Q6H PRN Lilia Shah MD   650 mg at 09/12/22 4600    Or    acetaminophen (TYLENOL) suppository 650 mg  650 mg Rectal Q6H PRN Lilia Shah MD        bisacodyL (DULCOLAX) suppository 10 mg  10 mg Rectal DAILY PRN Lilia Shah MD        promethazine (PHENERGAN) tablet 12.5 mg  12.5 mg Oral Q6H PRN Lilia Shah MD        Or    [facility]ansetron [facility]) injection 4 mg  4 mg IntraVENous Q6H PRN Lilia Shah MD        insulin lispro (HUMALOG) injection   SubCUTAneous AC&HS Lilia Shah MD   4 Units at 09/13/22 1152    glucose chewable tablet 16 g  4 Tablet Oral PRN Lilia Shah MD        glucagon (GLUCAGEN) injection 1 mg  1 mg IntraMUSCular PRN Lilia Shah MD        dextrose 10% infusion 0-250 mL  0-250 mL IntraVENous PRN Lilia Shah MD        [facility] [place] by provider] insulin glargine (LANTUS) injection 50 Units  50 Units SubCUTAneous QHS Lilia Shah MD        atorvastatin (LIPITOR) tablet 40 mg  40 mg Oral PCD Lilia Shah MD   40 mg at 09/12/22 1848    losartan (COZAAR) tablet 50 mg  50 mg Oral DAILY Lilia Shah MD   50 mg at 09/13/22 0820    montelukast (SINGULAIR) tablet 10 mg  10 mg Oral PCD Lilia Shah MD   10 mg at 09/12/22 1848    spironolactone (ALDACTONE) tablet 12.5 mg  12.5 mg Oral DAILY Samuel Smith MD   12.5 mg at 22 0820    oxyCODONE-acetaminophen (PERCOCET) 5-325 mg per tablet 1 Tablet  1 Tablet Oral Q6H PRN Samuel Smith MD   1 Tablet at 22 1849     Allergies   Allergen Reactions    Sulfa (Sulfonamide Antibiotics) Unknown (comments)     Patient doesn't recall     Social History     Socioeconomic History    Marital status:      Spouse name: Not on file    Number of children: Not on file    Years of education: Not on file    Highest education level: Not on file   Occupational History    Not on file   Tobacco Use    Smoking status: Not on file    Smokeless tobacco: Not on file   Substance and Sexual Activity    Alcohol use: Not on file    Drug use: Not on file    Sexual activity: Not on file   Other Topics Concern    Not on file   Social History Narrative    Not on file     Social Determinants of Health     Financial Resource Strain: Not on file   Food Insecurity: Not on file   Transportation Needs: Not on file   Physical Activity: Not on file   Stress: Not on file   Social Connections: Not on file   Intimate Partner Violence: Not on file   Housing Stability: Not on file        Review of Systems    Negative Unless BOLDED    General: fevers, chills, myalgias, arthralgias, unexplained weight loss, malaise, fatigue. Objective:      Visit Vitals  BP (!) 142/76   Pulse 81   Temp 98.2 °F (36.8 °C)   Resp 18   Ht 5' 8\" (1.727 m)   Wt 127.5 kg (281 lb)   SpO2 98%   BMI 42.73 kg/m²     Temp (24hrs), Av.8 °F (36.6 °C), Min:97.3 °F (36.3 °C), Max:98.4 °F (36.9 °C)      Lines / Catheters:   PIV    General:   Comfortable/no distress--on RA    Skin:   no rashes or skin lesions noted on limited exam  R hallux-- swollen, red and open drainage from 1st PIP joint site-- dressed, not seen   HEENT:  Normocephalic, atraumatic,       Lungs:   non-labored       Abdomen:  soft, non-distended, active bowel sounds.   Appropriate surgical scars for stated surgeries. Non-tender   Genitourinary:  deferred     Results       Procedure Component Value Units Date/Time    CULTURE, ANAEROBIC [[id number]] Collected: 09/12/22 1745    Order Status: Sent Specimen: Toe Updated: 09/13/22 0227    CULTURE, Darral Pears STAIN [[id number]] Collected: 09/12/22 1745    Order Status: Sent Specimen: Wound from Toe Updated: 09/13/22 0227    CULTURE, Darral Pears STAIN [[id number]] Collected: 09/12/22 0049    Order Status: Completed Specimen: Wound from Drainage Updated: 09/13/22 0711     Special Requests: NO SPECIAL REQUESTS        GRAM STAIN OCCASIONAL WBCS SEEN         NO DEFINITE ORGANISM SEEN        Culture result: NO GROWTH THUS FAR       CULTURE, BLOOD [[id number]] Collected: 09/11/22 1400    Order Status: Completed Specimen: Blood Updated: 09/13/22 0806     Special Requests: NO SPECIAL REQUESTS        Culture result: NO GROWTH 2 DAYS       CULTURE, BLOOD [[id number]] Collected: 09/11/22 1345    Order Status: Completed Specimen: Blood Updated: 09/13/22 0806     Special Requests: NO SPECIAL REQUESTS        Culture result: NO GROWTH 2 DAYS               Labs: Results:   Chemistry Recent Labs     09/13/22  0140 09/12/22 0429 09/11/22  1345   * 118* 79    138 140   K 4.4 4.3 4.5    105 108   CO2 28 28 28   BUN 10 11 15   CREA 0.57* 0.53* 0.65   CA 8.7 8.7 9.0   AGAP 6 5 4   BUCR 18 21* 23*   AP  --   --  71   TP  --   --  7.0   ALB  --   --  3.3*   GLOB  --   --  3.7   AGRAT  --   --  0.9      CBC w/Diff Recent Labs     09/13/22  0140 09/12/22 0429 09/11/22  1345   WBC 6.4 6.0 6.5   RBC 3.70* 3.82* 4.00*   HGB 11.8* 12.1* 12.9*   HCT 35.8* 37.5 39.7    199 218   GRANS 63 64 69   LYMPH 17* 18* 14*   EOS 4 4 4                Imaging: All imaging reviewed from Admission to present as per radiology interpretation in [facility]   MRI of foot- 9/8/22 Care Everywhere  1.  Loss of cortical definition of the proximal and distal phalanx of the first digit along with some bone marrow edema and heterogeneous enhancement suggestive of osteomyelitis. There is also a fluid collection at the medial aspect of the first interphalangeal joint worrisome for abscess. 2. Soft tissue edema. Consider cellulitis.    3. The findings and impression of this report were called to the treating physician, Charo with read back at 9:15 PM.

## 2022-09-14 VITALS
BODY MASS INDEX: 38.65 KG/M2 | SYSTOLIC BLOOD PRESSURE: 129 MMHG | OXYGEN SATURATION: 97 % | RESPIRATION RATE: 18 BRPM | DIASTOLIC BLOOD PRESSURE: 82 MMHG | HEIGHT: 68 IN | WEIGHT: 255 LBS | HEART RATE: 97 BPM | TEMPERATURE: 98.3 F

## 2022-09-14 LAB
ANION GAP SERPL CALC-SCNC: 6 MMOL/L (ref 3–18)
BACTERIA SPEC CULT: NORMAL
BASOPHILS # BLD: 0.1 K/UL (ref 0–0.1)
BASOPHILS NFR BLD: 1 % (ref 0–2)
BUN SERPL-MCNC: 13 MG/DL (ref 7–18)
BUN/CREAT SERPL: 16 (ref 12–20)
CALCIUM SERPL-MCNC: 9.1 MG/DL (ref 8.5–10.1)
CHLORIDE SERPL-SCNC: 103 MMOL/L (ref 100–111)
CO2 SERPL-SCNC: 26 MMOL/L (ref 21–32)
CREAT SERPL-MCNC: 0.82 MG/DL (ref 0.6–1.3)
DIFFERENTIAL METHOD BLD: ABNORMAL
EOSINOPHIL # BLD: 0.2 K/UL (ref 0–0.4)
EOSINOPHIL NFR BLD: 2 % (ref 0–5)
ERYTHROCYTE [DISTWIDTH] IN BLOOD BY AUTOMATED COUNT: 14.3 % (ref 11.6–14.5)
GLUCOSE BLD STRIP.AUTO-MCNC: 208 MG/DL (ref 70–110)
GLUCOSE BLD STRIP.AUTO-MCNC: 235 MG/DL (ref 70–110)
GLUCOSE SERPL-MCNC: 200 MG/DL (ref 74–99)
GRAM STN SPEC: NORMAL
GRAM STN SPEC: NORMAL
HCT VFR BLD AUTO: 39.9 % (ref 36–48)
HGB BLD-MCNC: 13.2 G/DL (ref 13–16)
IMM GRANULOCYTES # BLD AUTO: 0 K/UL (ref 0–0.04)
IMM GRANULOCYTES NFR BLD AUTO: 0 % (ref 0–0.5)
LYMPHOCYTES # BLD: 0.9 K/UL (ref 0.9–3.6)
LYMPHOCYTES NFR BLD: 10 % (ref 21–52)
MAGNESIUM SERPL-MCNC: 1.9 MG/DL (ref 1.6–2.6)
MCH RBC QN AUTO: 31.9 PG (ref 24–34)
MCHC RBC AUTO-ENTMCNC: 33.1 G/DL (ref 31–37)
MCV RBC AUTO: 96.4 FL (ref 78–100)
MONOCYTES # BLD: 1 K/UL (ref 0.05–1.2)
MONOCYTES NFR BLD: 12 % (ref 3–10)
NEUTS SEG # BLD: 6.1 K/UL (ref 1.8–8)
NEUTS SEG NFR BLD: 74 % (ref 40–73)
NRBC # BLD: 0 K/UL (ref 0–0.01)
NRBC BLD-RTO: 0 PER 100 WBC
PLATELET # BLD AUTO: 196 K/UL (ref 135–420)
PMV BLD AUTO: 9.5 FL (ref 9.2–11.8)
POTASSIUM SERPL-SCNC: 4.2 MMOL/L (ref 3.5–5.5)
RBC # BLD AUTO: 4.14 M/UL (ref 4.35–5.65)
SERVICE CMNT-IMP: NORMAL
SODIUM SERPL-SCNC: 135 MMOL/L (ref 136–145)
WBC # BLD AUTO: 8.3 K/UL (ref 4.6–13.2)

## 2022-09-14 PROCEDURE — 83735 ASSAY OF MAGNESIUM: CPT

## 2022-09-14 PROCEDURE — 74011000250 HC RX REV CODE- 250: Performed by: HOSPITALIST

## 2022-09-14 PROCEDURE — 74011250637 HC RX REV CODE- 250/637: Performed by: HOSPITALIST

## 2022-09-14 PROCEDURE — 74011636637 HC RX REV CODE- 636/637: Performed by: HOSPITALIST

## 2022-09-14 PROCEDURE — 85025 COMPLETE CBC W/AUTO DIFF WBC: CPT

## 2022-09-14 PROCEDURE — 74011250636 HC RX REV CODE- 250/636: Performed by: PHYSICIAN ASSISTANT

## 2022-09-14 PROCEDURE — 82962 GLUCOSE BLOOD TEST: CPT

## 2022-09-14 PROCEDURE — 80048 BASIC METABOLIC PNL TOTAL CA: CPT

## 2022-09-14 PROCEDURE — 36415 COLL VENOUS BLD VENIPUNCTURE: CPT

## 2022-09-14 RX ADMIN — SODIUM CHLORIDE, PRESERVATIVE FREE 10 ML: 5 INJECTION INTRAVENOUS at 05:33

## 2022-09-14 RX ADMIN — LOSARTAN POTASSIUM 50 MG: 50 TABLET, FILM COATED ORAL at 08:38

## 2022-09-14 RX ADMIN — VANCOMYCIN HYDROCHLORIDE 1750 MG: 10 INJECTION, POWDER, LYOPHILIZED, FOR SOLUTION INTRAVENOUS at 05:25

## 2022-09-14 RX ADMIN — Medication 6 UNITS: at 11:30

## 2022-09-14 RX ADMIN — Medication 4 UNITS: at 07:30

## 2022-09-14 RX ADMIN — SPIRONOLACTONE 12.5 MG: 25 TABLET ORAL at 08:38

## 2022-09-14 NOTE — DIABETES MGMT
Diabetes/ Glycemic Control Plan of Care  Recommendations:   Recommend increasing Lantus to 20 units q 24 hrs, if patient does not discharge today. May consider scheduled mealtime Humalog once eating >50% of tray    Assessment:  Blood sugars remain elevated on current insulin regimen - 122-235 mg/dL over the past 24 hours. Lantus initiated last PM, corrective scale advanced to a very resistant scale. Spoke with patient at bedside - he reports that he ate \"some\" dinner last night, but did not eat breakfast this morning. Patient would benefit from scheduled mealtime insulin once eating >50% of tray. Plan is for discharge today to [facility]. Patient to resume prior home insulin regimen of Novolog 70/30 with dinner dose - discussed with patient, patient states understanding. Recent Glucose Results:   Lab Results   Component Value Date/Time     (H) 09/14/2022 01:55 AM     (H) 09/13/2022 02:30 PM    GLUCPOC 208 (H) 09/14/2022 11:49 AM    GLUCPOC 235 (H) 09/14/2022 07:12 AM    GLUCPOC 197 (H) 09/13/2022 10:17 PM          Within target range (70-180mg/dL):  NO  Current insulin orders:    Lantus 10 units every 24 hours   Corrective Humalog ACHS - very resistant scale  Total Daily Dose previous 24 hours = 20 units     Plan/Goals:   Blood glucose will be within target of 70 - 180 mg/dl within 72 hours  Reinforce dietary and medication compliance at home.        Education:  [x] Refer to Diabetes Education Record                       [] Education not indicated at this time       Carolynn Keene RN, BSN, [facility] [employer]  Professional   Glycemic Control Team   Phone:  [phone]  Tues - Thurs 8:30 - 4:30

## 2022-09-14 NOTE — DIABETES MGMT
Diabetes Patient/Family Education Record    Factors That May Influence Patients Ability to Learn or Comply with Recommendations   []   Language barrier    []   Cultural needs   []   Motivation    []   Cognitive limitation    []   Physical   []   Education    []   Physiological factors   []   Hearing/vision/speaking impairment   []   Evangelical beliefs    []   Financial factors   []  Other:   [x]  No factors identified at this time. Person Instructed:   [x]   Patient   []   Family   []  Other     Preference for Learning:   [x]   Verbal   []   Written   []  Demonstration     Level of Comprehension & Competence:    [x]  Good                                      [] Fair                                     []  Poor                             []  Needs Reinforcement   []  Teach back completed    Education Component:   [x]  Medication management, including how to resume home insulin regimen of Novolog 70/30   []  Nutritional management - [] Obtained usual meal pattern   []   Basic carbohydrate counting  []  Plate method  []  Limit concentrated sweets and avoid sweetened beverages  []  Portion control  []    Avoid skipping meals   []  Exercise   []  Signs, symptoms, and treatment of hyperglycemia and hypoglycemia   [] Prevention, recognition and treatment of hyperglycemia and hypoglycemia   [x]  Importance of blood glucose monitoring  [] Blood Glucose targets   []   Provided patient with blood glucose meter  [x]  Has glucometer and supplies at home Wears a Continuous Glucose Monitor (will resume upon discharge)   []  Instruction on use of the blood glucose meter and recommended monitoring schedule   [x]  Discuss the importance of HbA1C monitoring. Patients A1c is _7.1__ %.  This is equivalent to average glucose of ___ mg/dl for the past 2-3 months.   []  Sick day guidelines   []  Proper use and disposal of lancets, needles, syringes or insulin pens (if appropriate) []  Potential long-term complications (retinopathy, kidney disease, neuropathy, foot care)   [] Information about whom to contact in case of emergency or for more information    [x]  Goal:  Patient/family will demonstrate understanding of Diabetes Self- Management Skills by: (date) _10/14/22______  Plan for post-discharge education or self-management support:    [] Outpatient class schedule provided            [] Patient Declined    [] Scheduled for outpatient classes (date) _______    [] Written information provided  Verify: [x] Prior to admission Diabetes medications    Does patient understand how diabetes medications work? _______yes_____________________  Does patient have difficulty obtaining diabetes medications or testing supplies? ______no___________    Ismael Braxton RN, BSN, [facility]  Professional   Glycemic Control Team   Phone:  [phone]  Tues - Thurs 8:30 - 4:30

## 2022-09-14 NOTE — PROGRESS NOTES
CM and provider met with pt at bedside this morning to discuss care transition. Noted no need for ABX upon discharge. Pt has been ambulating independently with a surgical shoe. Anticipate pt will transition home today with physician follow up. Pt's wife will transport pt home today. No other care transition needs have been identifeid. Care Management Interventions  Mode of Transport at Discharge:  Other (see comment) (Family)  Transition of Care Consult (CM Consult): Discharge Planning  Health Maintenance Reviewed: Yes  Support Systems: Spouse/Significant Other  Confirm Follow Up Transport: Self  The Plan for Transition of Care is Related to the Following Treatment Goals : Home with physician follow up  Discharge Location  Patient Expects to be Discharged to[de-identified] Home with family assistance

## 2022-09-14 NOTE — PROGRESS NOTES
Pt discharged home with spouse. Discharge instructions given in depth, answered all questions/concerns. PIV removed. All belongings sent with pt.

## 2022-09-14 NOTE — DISCHARGE SUMMARY
Discharge Summary    Patient: Ila Pollock MRN: [id number]  CSN: [id number]    YOB: 1950  Age: 67 y.o. Sex: male    DOA: 9/11/2022 LOS:  LOS: 3 days   Discharge Date: 9/14/2022     Primary Care Provider:  Carla Almanza DO    Admission Diagnoses: Osteomyelitis of great toe of right foot [facility]) [M86.9]    Discharge Diagnoses:    Problem List as of 9/14/2022 Never Reviewed            Codes Class Noted - Resolved    * (Principal) [facility]omyel[facility] great [facility] ICD-10-CM: M86.9  ICD-9-CM: 730.27  9/11/2022 - Present        A-fib ([facility].) ICD-10-CM: I48.91  ICD-9-CM: 427.31  9/11/2022 - Present        Controlled type 2 diabetes mellitus, with long-term current use of insulin (HCC) ICD-10-CM: E11.9, Z79.4  ICD-9-CM: 250.00, V58.67  9/11/2022 - Present        KOLE on CPAP ICD-10-CM: G47.33, Z99.89  ICD-9-CM: 327.23, V46.8  9/11/2022 - Present           Discharge Medications:     Discharge Medication List as of 9/14/2022  1:18 PM        CONTINUE these medications which have NOT CHANGED    Details   furosemide (LASIX) 40 mg tablet Take  by mouth daily. , Historical Med      atorvastatin (LIPITOR) 40 mg tablet Take 40 mg by mouth daily (after dinner). , Historical Med      losartan (COZAAR) 50 mg tablet Take 50 mg by mouth daily. , Historical Med      metFORMIN ER (GLUCOPHAGE XR) 750 mg tablet Take 750 mg by mouth Daily (before dinner). Two tablets, Historical Med      montelukast (SINGULAIR) 10 mg tablet Take 10 mg by mouth daily (after dinner). , Historical Med      spironolactone (ALDACTONE) 25 mg tablet Take 25 mg by mouth daily. Take one half tablet once daily, Historical Med      warfarin (COUMADIN) 5 mg tablet Take 5 mg by mouth daily. Take one and 1/2 tablets every day per INR, Historical Med      tamsulosin (FLOMAX) 0.4 mg capsule Take 0.4 mg by mouth daily. , Historical Med      insulin NPH/insulin regular (NOVOLIN 70/30, HUMULIN 70/30) 100 unit/mL (70-30) injection 60-70 Units by SubCUTAneous route Before breakfast and dinner. 70 units with breakfast + 60 units with dinner  Indications: type 2 diabetes mellitus, Historical Med      gabapentin (NEURONTIN) 300 mg capsule Take 300 mg by mouth three (3) times daily. , Historical Med      Cetirizine 10 mg cap Take  by mouth., Historical Med      carvediloL (COREG) 25 mg tablet Take 25 mg by mouth two (2) times daily (with meals). , Historical Med      ipratropium (ATROVENT) 21 mcg (0.03 %) nasal spray 2 Sprays three (3) times daily. , Historical Med           STOP taking these medications       amoxicillin-clavulanate (AUGMENTIN) 875-125 mg per tablet Comments:   Reason for Stopping:         meloxicam (MOBIC) 7.5 mg tablet Comments:   Reason for Stopping:               Discharge Condition: Good    Procedures : 1. Excision of open ulceration, right foot. 2.  Incision to bone cortex, distal phalanx, right foot. 3.  Amputation of great toe with metatarsal head resection, right foot. 4.  Soft tissue rearrangement, less than 20 cm2, right foot. Consults: Infectious Disease and podiatry      PHYSICAL EXAM   Visit Vitals  /82 (BP 1 Location: Right upper arm, BP Patient Position: Sitting)   Pulse 97   Temp 98.3 °F (36.8 °C)   Resp 18   Ht 5' 8\" (1.727 m)   Wt 115.7 kg (255 lb)   SpO2 97%   BMI 38.77 kg/m²     General: Awake, cooperative, no acute distress    HEENT: NC, Atraumatic. PERRLA, EOMI. Anicteric sclerae. Lungs:  CTA Bilaterally. No Wheezing/Rhonchi/Rales. Heart:  Regular  rhythm,  No murmur, No Rubs, No Gallops  Abdomen: Soft, Non distended, Non tender. +Bowel sounds,   Extremities: Left foot with dressing  Psych:   Not anxious or agitated. Neurologic:  No acute neurological deficits. Admission HPI :   Doug Ascencio is a 67 y.o. male with hypertension, A. fib on warfarin,  DM, scotty on cpap was sent to ER per Dr. Kyree Mcfarlane podiatrist due to left toe OM. He has left toe lesion for 2 to 3 weeks.   He was giving p.o. antibiotics, his foot swelling did not got improving. He was sent to see Dr. Arthur Camp. He got  recent MRI done last Friday, he was called to go to ER for further evaluation and treatment because of osteomyelitis of the toe from MRI report. Denies any slurred speech/headache/cp/n/v/blurred vission/d/c/palpitation/gait change/bleeding. Denies smoking/ any alcohol or drug use. He uses cpap at night     Hospital Course :   Mr. Elida Case was admitted to medical floor, he was seen and followed by ID, podiatry. Osteomyelitis of left toe -  Local wound care  S/p 1. Excision of open ulceration, right foot. 2.  Incision to bone cortex, distal phalanx, right foot. 3.  Amputation of great toe with metatarsal head resection, right foot. 4.  Soft tissue rearrangement, less than 20 cm2, right foot. Received IV antibiotics. Post surgery antibiotics. Likely surgical cure. Follow up with podiatry     A. fib on warfarin -  Seen by cardiology  Held warfarin for  procedure  Restart at discharge     KOLE -  CPAP at night. Hypertension -  Continued home medication        DM type II , with complication -  Resume home insulin    Activity: Activity as tolerated    Diet: Diabetic Diet    Follow-up: PCP, podiatry    Disposition: home with home health    Minutes spent on discharge: 45       Labs: Results:       Chemistry Recent Labs     09/14/22  0155 09/13/22  1430 09/13/22  0140   * 233* 122*   * 135* 140   K 4.2 4.0 4.4    102 106   CO2 26 26 28   BUN 13 10 10   CREA 0.82 0.80 0.57*   CA 9.1 9.0 8.7   AGAP 6 7 6   BUCR 16 13 18      CBC w/Diff Recent Labs     09/14/22  0155 09/13/22  0140 09/12/22  0429   WBC 8.3 6.4 6.0   RBC 4.14* 3.70* 3.82*   HGB 13.2 11.8* 12.1*   HCT 39.9 35.8* 37.5    189 199   GRANS 74* 63 64   LYMPH 10* 17* 18*   EOS 2 4 4      Cardiac Enzymes No results for input(s): CPK, CKND1, YAZ in the last 72 hours.     No lab exists for component: CKRMB, TROIP   Coagulation Recent Labs     09/12/22  0400   PTP 22.5*   INR 1.9*       Lipid Panel No results found for: CHOL, CHOLPOCT, CHOLX, CHLST, CHOLV, 603678, HDL, HDLP, LDL, LDLC, DLDLP, 643262, VLDLC, VLDL, TGLX, TRIGL, TRIGP, TGLPOCT, CHHD, CHHDX   BNP No results for input(s): BNPP in the last 72 hours. Liver Enzymes No results for input(s): TP, ALB, TBIL, AP in the last 72 hours. No lab exists for component: SGOT, GPT, DBIL   Thyroid Studies No results found for: T4, T3U, TSH, TSHEXT         Significant Diagnostic Studies: ECHO ADULT COMPLETE    Result Date: 9/12/2022  Formatting of this result is different from the original.   Left Ventricle: Normal left ventricular systolic function with a visually estimated EF of 55 - 60%. Left ventricle size is normal. Normal wall thickness. Normal wall motion. Left Atrium: Left atrium is moderately dilated. Right Atrium: Right atrium is moderately dilated. Mitral Valve: Mild to moderate regurgitation. Tricuspid Valve: The estimated RVSP is 50 mmHg. No results found for this or any previous visit. Please note that this dictation was completed with [employer], the computer voice recognition software. Quite often unanticipated grammatical, syntax, homophones, and other interpretive errors are inadvertently transcribed by the computer software. Please disregard these errors. Please excuse any errors that have escaped final proofreading.      CC: Castro November, DO

## 2022-09-15 LAB
BACTERIA SPEC CULT: ABNORMAL
BACTERIA SPEC CULT: ABNORMAL
GRAM STN SPEC: ABNORMAL
GRAM STN SPEC: ABNORMAL
SERVICE CMNT-IMP: ABNORMAL
SERVICE CMNT-IMP: ABNORMAL

## 2022-09-17 LAB
BACTERIA SPEC CULT: NORMAL
BACTERIA SPEC CULT: NORMAL
SERVICE CMNT-IMP: NORMAL
SERVICE CMNT-IMP: NORMAL

## 2023-04-11 ENCOUNTER — HOSPITAL ENCOUNTER (OUTPATIENT)
Facility: HOSPITAL | Age: 73
Discharge: HOME | End: 2023-04-12
Attending: EMERGENCY MEDICINE | Admitting: UROLOGY
Payer: MEDICARE

## 2023-04-11 DIAGNOSIS — R33.8 ACUTE URINARY RETENTION: Primary | ICD-10-CM

## 2023-04-11 LAB
ALBUMIN SERPL-MCNC: 3.8 G/DL (ref 3.4–5)
ALBUMIN/GLOB SERPL: 0.8 (ref 0.8–1.7)
ALP SERPL-CCNC: 86 U/L (ref 45–117)
ALT SERPL-CCNC: 26 U/L (ref 16–61)
ANION GAP SERPL CALC-SCNC: 8 MMOL/L (ref 3–18)
AST SERPL-CCNC: 30 U/L (ref 10–38)
BASOPHILS # BLD: 0.1 K/UL (ref 0–0.1)
BASOPHILS NFR BLD: 1 % (ref 0–2)
BILIRUB SERPL-MCNC: 1.1 MG/DL (ref 0.2–1)
BUN SERPL-MCNC: 20 MG/DL (ref 7–18)
BUN/CREAT SERPL: 21 (ref 12–20)
CALCIUM SERPL-MCNC: 9.3 MG/DL (ref 8.5–10.1)
CHLORIDE SERPL-SCNC: 108 MMOL/L (ref 100–111)
CO2 SERPL-SCNC: 20 MMOL/L (ref 21–32)
CREAT SERPL-MCNC: 0.96 MG/DL (ref 0.6–1.3)
DIFFERENTIAL METHOD BLD: ABNORMAL
EOSINOPHIL # BLD: 0.1 K/UL (ref 0–0.4)
EOSINOPHIL NFR BLD: 1 % (ref 0–5)
ERYTHROCYTE [DISTWIDTH] IN BLOOD BY AUTOMATED COUNT: 17.3 % (ref 11.6–14.5)
GLOBULIN SER CALC-MCNC: 4.6 G/DL (ref 2–4)
GLUCOSE SERPL-MCNC: 178 MG/DL (ref 74–99)
HCT VFR BLD AUTO: 40.8 % (ref 36–48)
HGB BLD-MCNC: 13.8 G/DL (ref 13–16)
IMM GRANULOCYTES # BLD AUTO: 0.1 K/UL (ref 0–0.04)
IMM GRANULOCYTES NFR BLD AUTO: 1 % (ref 0–0.5)
LYMPHOCYTES # BLD: 0.6 K/UL (ref 0.9–3.6)
LYMPHOCYTES NFR BLD: 6 % (ref 21–52)
MCH RBC QN AUTO: 33.3 PG (ref 24–34)
MCHC RBC AUTO-ENTMCNC: 33.8 G/DL (ref 31–37)
MCV RBC AUTO: 98.6 FL (ref 78–100)
MONOCYTES # BLD: 0.9 K/UL (ref 0.05–1.2)
MONOCYTES NFR BLD: 8 % (ref 3–10)
NEUTS SEG # BLD: 9.2 K/UL (ref 1.8–8)
NEUTS SEG NFR BLD: 84 % (ref 40–73)
NRBC # BLD: 0 K/UL (ref 0–0.01)
NRBC BLD-RTO: 0 PER 100 WBC
PLATELET # BLD AUTO: 248 K/UL (ref 135–420)
PMV BLD AUTO: 8.8 FL (ref 9.2–11.8)
POTASSIUM SERPL-SCNC: 4.1 MMOL/L (ref 3.5–5.5)
PROT SERPL-MCNC: 8.4 G/DL (ref 6.4–8.2)
RBC # BLD AUTO: 4.14 M/UL (ref 4.35–5.65)
SODIUM SERPL-SCNC: 136 MMOL/L (ref 136–145)
WBC # BLD AUTO: 11 K/UL (ref 4.6–13.2)

## 2023-04-11 PROCEDURE — 99285 EMERGENCY DEPT VISIT HI MDM: CPT

## 2023-04-11 PROCEDURE — 85025 COMPLETE CBC W/AUTO DIFF WBC: CPT

## 2023-04-11 PROCEDURE — 84484 ASSAY OF TROPONIN QUANT: CPT

## 2023-04-11 PROCEDURE — 80053 COMPREHEN METABOLIC PANEL: CPT

## 2023-04-11 ASSESSMENT — ENCOUNTER SYMPTOMS
RESPIRATORY NEGATIVE: 1
ABDOMINAL PAIN: 1
EYES NEGATIVE: 1
ALLERGIC/IMMUNOLOGIC NEGATIVE: 1

## 2023-04-11 ASSESSMENT — PAIN DESCRIPTION - DESCRIPTORS: DESCRIPTORS: PRESSURE

## 2023-04-11 ASSESSMENT — PAIN DESCRIPTION - LOCATION: LOCATION: ABDOMEN

## 2023-04-12 ENCOUNTER — APPOINTMENT (OUTPATIENT)
Facility: HOSPITAL | Age: 73
End: 2023-04-12
Payer: MEDICARE

## 2023-04-12 VITALS
SYSTOLIC BLOOD PRESSURE: 147 MMHG | HEART RATE: 91 BPM | HEIGHT: 68 IN | DIASTOLIC BLOOD PRESSURE: 69 MMHG | BODY MASS INDEX: 37.89 KG/M2 | RESPIRATION RATE: 18 BRPM | WEIGHT: 250 LBS | TEMPERATURE: 97.1 F | OXYGEN SATURATION: 95 %

## 2023-04-12 PROBLEM — R33.9 URINARY RETENTION: Status: ACTIVE | Noted: 2023-04-12

## 2023-04-12 LAB
GLUCOSE BLD STRIP.AUTO-MCNC: 154 MG/DL (ref 70–110)
INR PPP: 1.6 (ref 0.8–1.2)
PROTHROMBIN TIME: 19.8 SEC (ref 11.5–15.2)
TROPONIN I SERPL HS-MCNC: 29 NG/L (ref 0–78)

## 2023-04-12 PROCEDURE — C1769 GUIDE WIRE: HCPCS | Performed by: UROLOGY

## 2023-04-12 PROCEDURE — 7100000010 HC PHASE II RECOVERY - FIRST 15 MIN: Performed by: UROLOGY

## 2023-04-12 PROCEDURE — 3600000012 HC SURGERY LEVEL 2 ADDTL 15MIN: Performed by: UROLOGY

## 2023-04-12 PROCEDURE — 85610 PROTHROMBIN TIME: CPT

## 2023-04-12 PROCEDURE — 3700000001 HC ADD 15 MINUTES (ANESTHESIA): Performed by: UROLOGY

## 2023-04-12 PROCEDURE — 93005 ELECTROCARDIOGRAM TRACING: CPT | Performed by: UROLOGY

## 2023-04-12 PROCEDURE — G0378 HOSPITAL OBSERVATION PER HR: HCPCS

## 2023-04-12 PROCEDURE — 6360000004 HC RX CONTRAST MEDICATION: Performed by: UROLOGY

## 2023-04-12 PROCEDURE — 7100000011 HC PHASE II RECOVERY - ADDTL 15 MIN: Performed by: UROLOGY

## 2023-04-12 PROCEDURE — 6370000000 HC RX 637 (ALT 250 FOR IP): Performed by: EMERGENCY MEDICINE

## 2023-04-12 PROCEDURE — 2500000003 HC RX 250 WO HCPCS

## 2023-04-12 PROCEDURE — 6360000002 HC RX W HCPCS: Performed by: UROLOGY

## 2023-04-12 PROCEDURE — 71045 X-RAY EXAM CHEST 1 VIEW: CPT

## 2023-04-12 PROCEDURE — 3600000002 HC SURGERY LEVEL 2 BASE: Performed by: UROLOGY

## 2023-04-12 PROCEDURE — 2709999900 HC NON-CHARGEABLE SUPPLY: Performed by: UROLOGY

## 2023-04-12 PROCEDURE — C1758 CATHETER, URETERAL: HCPCS | Performed by: UROLOGY

## 2023-04-12 PROCEDURE — 6360000002 HC RX W HCPCS: Performed by: EMERGENCY MEDICINE

## 2023-04-12 PROCEDURE — 3700000000 HC ANESTHESIA ATTENDED CARE: Performed by: UROLOGY

## 2023-04-12 PROCEDURE — 7100000001 HC PACU RECOVERY - ADDTL 15 MIN: Performed by: UROLOGY

## 2023-04-12 PROCEDURE — 82962 GLUCOSE BLOOD TEST: CPT

## 2023-04-12 PROCEDURE — 7100000000 HC PACU RECOVERY - FIRST 15 MIN: Performed by: UROLOGY

## 2023-04-12 PROCEDURE — C1726 CATH, BAL DIL, NON-VASCULAR: HCPCS | Performed by: UROLOGY

## 2023-04-12 RX ORDER — LABETALOL HYDROCHLORIDE 5 MG/ML
10 INJECTION, SOLUTION INTRAVENOUS EVERY 10 MIN PRN
Status: DISCONTINUED | OUTPATIENT
Start: 2023-04-12 | End: 2023-04-12 | Stop reason: HOSPADM

## 2023-04-12 RX ORDER — SODIUM CHLORIDE 9 MG/ML
INJECTION, SOLUTION INTRAVENOUS PRN
Status: DISCONTINUED | OUTPATIENT
Start: 2023-04-12 | End: 2023-04-12 | Stop reason: HOSPADM

## 2023-04-12 RX ORDER — LORAZEPAM 2 MG/ML
1 INJECTION INTRAMUSCULAR ONCE
Status: DISCONTINUED | OUTPATIENT
Start: 2023-04-12 | End: 2023-04-12 | Stop reason: HOSPADM

## 2023-04-12 RX ORDER — LIDOCAINE HYDROCHLORIDE 20 MG/ML
JELLY TOPICAL
Status: COMPLETED | OUTPATIENT
Start: 2023-04-12 | End: 2023-04-12

## 2023-04-12 RX ORDER — CEPHALEXIN 250 MG/1
500 CAPSULE ORAL EVERY 8 HOURS SCHEDULED
Status: DISCONTINUED | OUTPATIENT
Start: 2023-04-12 | End: 2023-04-12

## 2023-04-12 RX ORDER — MIDAZOLAM HYDROCHLORIDE 2 MG/2ML
2 INJECTION, SOLUTION INTRAMUSCULAR; INTRAVENOUS
Status: DISCONTINUED | OUTPATIENT
Start: 2023-04-12 | End: 2023-04-12 | Stop reason: HOSPADM

## 2023-04-12 RX ORDER — HYDROMORPHONE HYDROCHLORIDE 1 MG/ML
0.5 INJECTION, SOLUTION INTRAMUSCULAR; INTRAVENOUS; SUBCUTANEOUS EVERY 5 MIN PRN
Status: DISCONTINUED | OUTPATIENT
Start: 2023-04-12 | End: 2023-04-12 | Stop reason: HOSPADM

## 2023-04-12 RX ORDER — SODIUM CHLORIDE 0.9 % (FLUSH) 0.9 %
5-40 SYRINGE (ML) INJECTION EVERY 12 HOURS SCHEDULED
Status: DISCONTINUED | OUTPATIENT
Start: 2023-04-12 | End: 2023-04-12 | Stop reason: HOSPADM

## 2023-04-12 RX ORDER — LEVOFLOXACIN 500 MG/1
500 TABLET, FILM COATED ORAL DAILY
Status: DISCONTINUED | OUTPATIENT
Start: 2023-04-13 | End: 2023-04-12

## 2023-04-12 RX ORDER — MORPHINE SULFATE 4 MG/ML
4 INJECTION, SOLUTION INTRAMUSCULAR; INTRAVENOUS
Status: COMPLETED | OUTPATIENT
Start: 2023-04-12 | End: 2023-04-12

## 2023-04-12 RX ORDER — PROCHLORPERAZINE EDISYLATE 5 MG/ML
5 INJECTION INTRAMUSCULAR; INTRAVENOUS
Status: DISCONTINUED | OUTPATIENT
Start: 2023-04-12 | End: 2023-04-12 | Stop reason: HOSPADM

## 2023-04-12 RX ORDER — SODIUM CHLORIDE 0.9 % (FLUSH) 0.9 %
5-40 SYRINGE (ML) INJECTION PRN
Status: DISCONTINUED | OUTPATIENT
Start: 2023-04-12 | End: 2023-04-12 | Stop reason: HOSPADM

## 2023-04-12 RX ORDER — ONDANSETRON 2 MG/ML
4 INJECTION INTRAMUSCULAR; INTRAVENOUS
Status: DISCONTINUED | OUTPATIENT
Start: 2023-04-12 | End: 2023-04-12 | Stop reason: HOSPADM

## 2023-04-12 RX ORDER — FENTANYL CITRATE 50 UG/ML
25 INJECTION, SOLUTION INTRAMUSCULAR; INTRAVENOUS EVERY 5 MIN PRN
Status: DISCONTINUED | OUTPATIENT
Start: 2023-04-12 | End: 2023-04-12 | Stop reason: HOSPADM

## 2023-04-12 RX ORDER — HYDRALAZINE HYDROCHLORIDE 20 MG/ML
10 INJECTION INTRAMUSCULAR; INTRAVENOUS
Status: DISCONTINUED | OUTPATIENT
Start: 2023-04-12 | End: 2023-04-12 | Stop reason: HOSPADM

## 2023-04-12 RX ORDER — LORAZEPAM 2 MG/ML
1 INJECTION INTRAMUSCULAR EVERY 6 HOURS PRN
Status: DISCONTINUED | OUTPATIENT
Start: 2023-04-12 | End: 2023-04-12

## 2023-04-12 RX ORDER — LEVOFLOXACIN 5 MG/ML
500 INJECTION, SOLUTION INTRAVENOUS EVERY 24 HOURS
Status: DISCONTINUED | OUTPATIENT
Start: 2023-04-12 | End: 2023-04-12 | Stop reason: HOSPADM

## 2023-04-12 RX ORDER — MEPERIDINE HYDROCHLORIDE 50 MG/ML
12.5 INJECTION INTRAMUSCULAR; INTRAVENOUS; SUBCUTANEOUS EVERY 5 MIN PRN
Status: DISCONTINUED | OUTPATIENT
Start: 2023-04-12 | End: 2023-04-12 | Stop reason: HOSPADM

## 2023-04-12 RX ORDER — LIDOCAINE HYDROCHLORIDE 10 MG/ML
30 INJECTION, SOLUTION EPIDURAL; INFILTRATION; INTRACAUDAL; PERINEURAL
Status: COMPLETED | OUTPATIENT
Start: 2023-04-12 | End: 2023-04-12

## 2023-04-12 RX ORDER — DIPHENHYDRAMINE HYDROCHLORIDE 50 MG/ML
12.5 INJECTION INTRAMUSCULAR; INTRAVENOUS
Status: DISCONTINUED | OUTPATIENT
Start: 2023-04-12 | End: 2023-04-12 | Stop reason: HOSPADM

## 2023-04-12 RX ADMIN — LIDOCAINE HYDROCHLORIDE 30 ML: 10 INJECTION, SOLUTION EPIDURAL; INFILTRATION; INTRACAUDAL; PERINEURAL at 00:39

## 2023-04-12 RX ADMIN — LORAZEPAM 1 MG: 2 INJECTION INTRAMUSCULAR; INTRAVENOUS at 00:35

## 2023-04-12 RX ADMIN — LEVOFLOXACIN 500 MG: 5 INJECTION, SOLUTION INTRAVENOUS at 01:49

## 2023-04-12 RX ADMIN — MORPHINE SULFATE 4 MG: 4 INJECTION INTRAVENOUS at 00:08

## 2023-04-12 RX ADMIN — LIDOCAINE HYDROCHLORIDE: 20 JELLY TOPICAL at 00:00

## 2023-04-12 ASSESSMENT — PAIN SCALES - GENERAL: PAINLEVEL_OUTOF10: 5

## 2023-04-12 ASSESSMENT — PAIN - FUNCTIONAL ASSESSMENT: PAIN_FUNCTIONAL_ASSESSMENT: NONE - DENIES PAIN

## 2023-04-12 NOTE — ED PROVIDER NOTES
[facility] EMERGENCY DEPT  EMERGENCY DEPARTMENT ENCOUNTER      Pt Name: Corrinne Dare  MRN: [id number]  [place] 1950  Date of evaluation: 4/11/2023  Provider: SUNDEEP Parekh    CHIEF COMPLAINT       Chief Complaint   Patient presents with    Urinary Retention         HISTORY OF PRESENT ILLNESS   (Location/Symptom, Timing/Onset, Context/Setting, Quality, Duration, Modifying Factors, Severity)  Note limiting factors. Corrinne Dare is a 67 y.o. male who presents to the emergency department complaint of urinary retention. Patient says he has not had a ability to urinate for several hours now. History of urinary retention. Patient not actively followed by urology. Initial bladder scan shows 500 cc of urine retained. HPI    Nursing Notes were reviewed. REVIEW OF SYSTEMS    (2-9 systems for level 4, 10 or more for level 5)     Review of Systems   Constitutional:  Negative for fever. HENT: Negative. Eyes: Negative. Respiratory: Negative. Cardiovascular: Negative. Gastrointestinal:  Positive for abdominal pain. Endocrine: Negative. Genitourinary:  Positive for difficulty urinating. Musculoskeletal: Negative. Skin: Negative. Allergic/Immunologic: Negative. Neurological: Negative. Hematological: Negative. Psychiatric/Behavioral: Negative. Except as noted above the remainder of the review of systems was reviewed and negative. PAST MEDICAL HISTORY   History reviewed. No pertinent past medical history. SURGICAL HISTORY     History reviewed. No pertinent surgical history. CURRENT MEDICATIONS       Previous Medications    ATORVASTATIN (LIPITOR) 40 MG TABLET    Take 40 mg by mouth    CARVEDILOL (COREG) 25 MG TABLET    Take 25 mg by mouth 2 times daily (with meals)    CETIRIZINE HCL 10 MG CAPS    Take by mouth    FUROSEMIDE (LASIX) 40 MG TABLET    Take by mouth daily    GABAPENTIN (NEURONTIN) 300 MG CAPSULE    Take 300 mg by mouth 3 times daily.     INSULIN

## 2023-04-12 NOTE — DISCHARGE INSTRUCTIONS
decisions  Do  not drink alcoholic beverages  If you have not urinated within 8 hours after discharge, please contact your surgeon on call. Report the following to your surgeon:  Excessive pain, swelling, redness or odor of or around the surgical area  Temperature over 100.5  Nausea and vomiting lasting longer than 4 hours or if unable to take medications  Any signs of decreased circulation or nerve impairment to extremity: change in color, persistent  numbness, tingling, coldness or increase pain  Any questions    What to do at Home:    If you experience any of the following symptoms ABOVE, please follow up with DR. GENTILE. *  Please give a list of your current medications to your Primary Care Provider. *  Please update this list whenever your medications are discontinued, doses are      changed, or new medications (including over-the-counter products) are added. *  Please carry medication information at all times in case of emergency situations. These are general instructions for a healthy lifestyle:    No smoking/ No tobacco products/ Avoid exposure to second hand smoke  Surgeon General's Warning:  Quitting smoking now greatly reduces serious risk to your health. Obesity, smoking, and sedentary lifestyle greatly increases your risk for illness    A healthy diet, regular physical exercise & weight monitoring are important for maintaining a healthy lifestyle    You may be retaining fluid if you have a history of heart failure or if you experience any of the following symptoms:  Weight gain of 3 pounds or more overnight or 5 pounds in a week, increased swelling in our hands or feet or shortness of breath while lying flat in bed. Please call your doctor as soon as you notice any of these symptoms; do not wait until your next office visit. The discharge information has been reviewed with the patient and spouse. The patient and spouse verbalized understanding.   Discharge medications reviewed

## 2023-04-12 NOTE — ED NOTES
PT transported to [address] [place] [address] by OR RN. Report given by ED RN.   No questions from Steph Adame, RN  04/12/23 [id number]

## 2023-04-12 NOTE — ED NOTES
Fara BEAULIEU at bedside at 0000. Attempted to place Camacho catheter and unsuccessful after 1.5 hours. Pt to go to the OR at this time.       Dutch Rogers RN  04/12/23 2066

## 2023-04-12 NOTE — ED NOTES
Primary RN attempted to place catheter with no success x2. Additional 3 RNs attempted to also place catheter with no success. PA made aware urologist needs to be paged and PA reported RN to go in room again and attempt catheter again with PA. RN continues to be unsuccessful, urologist paged.         Yoni Damon RN  04/12/23 2593

## 2023-04-12 NOTE — PERIOP NOTE
Nursing supervisor alerted we were having trouble discharging and printing out discharge instructions due to the system. NS let us know that they would try to discharge the patient or get the ED to discharge. Will readdress.

## 2023-04-12 NOTE — H&P
mouth    Ar Automatic Reconciliation   montelukast (SINGULAIR) 10 MG tablet Take 10 mg by mouth    Ar Automatic Reconciliation   spironolactone (ALDACTONE) 25 MG tablet Take 25 mg by mouth daily    Ar Automatic Reconciliation   tamsulosin (FLOMAX) 0.4 MG capsule Take 0.4 mg by mouth daily    Ar Automatic Reconciliation   warfarin (COUMADIN) 5 MG tablet Take 5 mg by mouth daily    Ar Automatic Reconciliation              Objective:     Patient Vitals for the past 8 hrs:   BP Temp Pulse Resp SpO2 Height Weight   23 2204 (!) 163/95 98.1 °F (36.7 °C) 97 18 98 % 5' 8\" (1.727 m) 250 lb (113.4 kg)     Temp (24hrs), Av.1 °F (36.7 °C), Min:98.1 °F (36.7 °C), Max:98.1 °F (36.7 °C)  Intake and Output:   No intake/output data recorded. Physical Exam:  Constitutional Well-nourished well-developed male in mild to moderate discomfort   Neck Exam Inspection - Normal. Palpation - Normal. Thyroid gland - Normal.   Respiratory Inspection - No labored breathing   Abdomen No abdominal tenderness. Suprapubic distention mild   Genitourinary Buried penis, excoriation around the penis and lichen sclerosus,  Scrotum normal   Neurological Level of consciousness - Normal. Orientation - Normal.   Psychiatric Oriented to time, place, person and situation. Appropriate mood and affect.        Lab/Data Reviewed:  Lab Results   Component Value Date/Time     2023 10:45 PM    K 4.1 2023 10:45 PM     2023 10:45 PM    CO2 20 2023 10:45 PM    BUN 20 2023 10:45 PM    CREATININE 0.96 2023 10:45 PM    GLUCOSE 178 2023 10:45 PM    CALCIUM 9.3 2023 10:45 PM       CBC with Differential:    Lab Results   Component Value Date/Time    WBC 11.0 2023 10:45 PM    RBC 4.14 2023 10:45 PM    HGB 13.8 2023 10:45 PM    HCT 40.8 2023 10:45 PM     2023 10:45 PM    MCV 98.6 2023 10:45 PM    MCH 33.3 2023 10:45 PM    MCHC 33.8 2023 10:45 PM

## 2023-04-12 NOTE — ED NOTES
RN x 3 to bedside. Attempt to place conrad x3 without success.   MD aware will consult urology     Reyna Bang RN  04/11/23 [id number]

## 2023-04-12 NOTE — OP NOTE
Operative Note      Patient: Charlene Reina  YOB: 1950  MRN: [id number]    Date of Procedure: 4/12/2023    Pre-Op Diagnosis Codes:     * Retention of urine [R33.9]    Post-Op Diagnosis: Same       Procedure(s):  URETHRAL BALLOON DILATATION WITH C-ARM    Surgeon(s):  Lynda Dumont MD    Assistant:   * No surgical staff found *    Anesthesia: General    Estimated Blood Loss (mL): Minimal    Complications: None    Specimens:   * No specimens in log *    Implants:  * No implants in log *      Drains: * No LDAs found *    Findings: Buried penis, meatal stenosis and urethral stricture      Detailed Description of Procedure:   Patient was brought to the operating room placed in supine position. After administration of general anesthesia he was placed into the lithotomy position. Groin and genitalia were prepped and draped in usual sterile fashion I was able to pass a 0.038 sensor wire through the urethral meatus into the bladder I was able to confirm a coil in the area of the bladder on fluoroscopy. I then easily placed a 5 [employer] [place] open-ended catheter over the wire into the bladder removed the wire and filled the bladder with 30 cc of contrast this confirmed a distended bladder with the open-ended catheter in proper position. I then replaced the 0.038 sensor wire through the open-ended catheter into the bladder again confirmed with fluoroscopy I removed the open-ended catheter. Then using a 10 cm 15 Dominican ureteral balloon dilator I dilated the patient's urethra twice and remove the balloon under fluoroscopy. I was unable to pass a [facility] [employer] [place] Camacho catheter over the wire into the bladder so I removed the 14 Dominican catheter and placed a 12 Dominican Camacho catheter over the wire which advanced into the bladder dark yellow clear urine was obtained. The catheter was confirmed in position on fluoroscopy as well. The balloon was inflated with 10 cc of water. The catheter was then placed to a leg bag.   The

## 2023-04-12 NOTE — ED TRIAGE NOTES
Pt arrives to ed reporting urinary retention that began today.  Pt states he last urinated this morning around 8 am.

## 2023-04-12 NOTE — ED NOTES
RN to bedside, initial pt contact. Pt assessed. Bladder scan performed, 529 ml retained urine. Pt attempt to void in urinal and provide specimen. Pt has no other needs at this time.        Rita Bynum RN  04/11/23 3776

## 2023-04-14 LAB
EKG DIAGNOSIS: NORMAL
EKG Q-T INTERVAL: 322 MS
EKG QRS DURATION: 112 MS
EKG QTC CALCULATION (BAZETT): 423 MS
EKG R AXIS: -52 DEGREES
EKG T AXIS: 100 DEGREES
EKG VENTRICULAR RATE: 104 BPM

## 2023-05-23 ENCOUNTER — ANESTHESIA EVENT (OUTPATIENT)
Facility: HOSPITAL | Age: 73
End: 2023-05-23
Payer: MEDICARE

## 2023-05-24 ENCOUNTER — HOSPITAL ENCOUNTER (OUTPATIENT)
Facility: HOSPITAL | Age: 73
Discharge: HOME | End: 2023-05-27
Payer: MEDICARE

## 2023-05-24 LAB
EST. AVERAGE GLUCOSE BLD GHB EST-MCNC: 128 MG/DL
HBA1C MFR BLD: 6.1 % (ref 4.2–5.6)
INR PPP: 1 (ref 0.8–1.2)
PROTHROMBIN TIME: 13.7 SEC (ref 11.5–15.2)

## 2023-05-24 PROCEDURE — 83036 HEMOGLOBIN GLYCOSYLATED A1C: CPT

## 2023-05-24 PROCEDURE — 85610 PROTHROMBIN TIME: CPT

## 2023-05-24 PROCEDURE — 36415 COLL VENOUS BLD VENIPUNCTURE: CPT

## 2023-05-24 NOTE — PERIOP NOTE
Pt/INR not done today because it will be done per anesthesia protocol on dos of surgery.  Orders are placed

## 2023-05-25 ENCOUNTER — ANESTHESIA (OUTPATIENT)
Facility: HOSPITAL | Age: 73
End: 2023-05-25
Payer: MEDICARE

## 2023-05-25 ENCOUNTER — HOSPITAL ENCOUNTER (OUTPATIENT)
Facility: HOSPITAL | Age: 73
Setting detail: OUTPATIENT SURGERY
Discharge: HOME | End: 2023-05-25
Attending: UROLOGY | Admitting: UROLOGY
Payer: MEDICARE

## 2023-05-25 ENCOUNTER — APPOINTMENT (OUTPATIENT)
Facility: HOSPITAL | Age: 73
End: 2023-05-25
Attending: UROLOGY
Payer: MEDICARE

## 2023-05-25 VITALS
DIASTOLIC BLOOD PRESSURE: 74 MMHG | WEIGHT: 276.7 LBS | RESPIRATION RATE: 18 BRPM | OXYGEN SATURATION: 100 % | HEART RATE: 74 BPM | BODY MASS INDEX: 41.94 KG/M2 | TEMPERATURE: 97.2 F | HEIGHT: 68 IN | SYSTOLIC BLOOD PRESSURE: 148 MMHG

## 2023-05-25 LAB
GLUCOSE BLD STRIP.AUTO-MCNC: 73 MG/DL (ref 70–110)
GLUCOSE BLD STRIP.AUTO-MCNC: 78 MG/DL (ref 70–110)
GLUCOSE BLD STRIP.AUTO-MCNC: 87 MG/DL (ref 70–110)
INR PPP: 1 (ref 0.8–1.2)
PROTHROMBIN TIME: 13.4 SEC (ref 11.5–15.2)

## 2023-05-25 PROCEDURE — 2709999900 HC NON-CHARGEABLE SUPPLY: Performed by: UROLOGY

## 2023-05-25 PROCEDURE — 82962 GLUCOSE BLOOD TEST: CPT

## 2023-05-25 PROCEDURE — 85610 PROTHROMBIN TIME: CPT

## 2023-05-25 PROCEDURE — 3600000002 HC SURGERY LEVEL 2 BASE: Performed by: UROLOGY

## 2023-05-25 PROCEDURE — 3700000001 HC ADD 15 MINUTES (ANESTHESIA): Performed by: UROLOGY

## 2023-05-25 PROCEDURE — 3700000000 HC ANESTHESIA ATTENDED CARE: Performed by: UROLOGY

## 2023-05-25 PROCEDURE — 7100000010 HC PHASE II RECOVERY - FIRST 15 MIN: Performed by: UROLOGY

## 2023-05-25 PROCEDURE — 3600000012 HC SURGERY LEVEL 2 ADDTL 15MIN: Performed by: UROLOGY

## 2023-05-25 PROCEDURE — C1769 GUIDE WIRE: HCPCS | Performed by: UROLOGY

## 2023-05-25 PROCEDURE — 7100000001 HC PACU RECOVERY - ADDTL 15 MIN: Performed by: UROLOGY

## 2023-05-25 PROCEDURE — 2580000003 HC RX 258: Performed by: UROLOGY

## 2023-05-25 PROCEDURE — 7100000000 HC PACU RECOVERY - FIRST 15 MIN: Performed by: UROLOGY

## 2023-05-25 PROCEDURE — 6360000002 HC RX W HCPCS: Performed by: NURSE ANESTHETIST, CERTIFIED REGISTERED

## 2023-05-25 PROCEDURE — 6360000002 HC RX W HCPCS: Performed by: UROLOGY

## 2023-05-25 PROCEDURE — 36415 COLL VENOUS BLD VENIPUNCTURE: CPT

## 2023-05-25 PROCEDURE — C1726 CATH, BAL DIL, NON-VASCULAR: HCPCS | Performed by: UROLOGY

## 2023-05-25 PROCEDURE — 6360000004 HC RX CONTRAST MEDICATION: Performed by: UROLOGY

## 2023-05-25 PROCEDURE — 2500000003 HC RX 250 WO HCPCS: Performed by: ANESTHESIOLOGY

## 2023-05-25 PROCEDURE — 6360000002 HC RX W HCPCS: Performed by: ANESTHESIOLOGY

## 2023-05-25 PROCEDURE — 7100000011 HC PHASE II RECOVERY - ADDTL 15 MIN: Performed by: UROLOGY

## 2023-05-25 RX ORDER — MIDAZOLAM HYDROCHLORIDE 1 MG/ML
INJECTION INTRAMUSCULAR; INTRAVENOUS PRN
Status: DISCONTINUED | OUTPATIENT
Start: 2023-05-25 | End: 2023-05-25 | Stop reason: SDUPTHER

## 2023-05-25 RX ORDER — FENTANYL CITRATE 50 UG/ML
25 INJECTION, SOLUTION INTRAMUSCULAR; INTRAVENOUS EVERY 5 MIN PRN
Status: DISCONTINUED | OUTPATIENT
Start: 2023-05-25 | End: 2023-05-25 | Stop reason: HOSPADM

## 2023-05-25 RX ORDER — LIDOCAINE HYDROCHLORIDE 20 MG/ML
INJECTION, SOLUTION EPIDURAL; INFILTRATION; INTRACAUDAL; PERINEURAL PRN
Status: DISCONTINUED | OUTPATIENT
Start: 2023-05-25 | End: 2023-05-25 | Stop reason: SDUPTHER

## 2023-05-25 RX ORDER — FENTANYL CITRATE 50 UG/ML
INJECTION, SOLUTION INTRAMUSCULAR; INTRAVENOUS PRN
Status: DISCONTINUED | OUTPATIENT
Start: 2023-05-25 | End: 2023-05-25 | Stop reason: SDUPTHER

## 2023-05-25 RX ORDER — METOCLOPRAMIDE HYDROCHLORIDE 5 MG/ML
10 INJECTION INTRAMUSCULAR; INTRAVENOUS
Status: DISCONTINUED | OUTPATIENT
Start: 2023-05-25 | End: 2023-05-25 | Stop reason: HOSPADM

## 2023-05-25 RX ORDER — INSULIN LISPRO 100 [IU]/ML
0-8 INJECTION, SOLUTION INTRAVENOUS; SUBCUTANEOUS ONCE
Status: DISCONTINUED | OUTPATIENT
Start: 2023-05-25 | End: 2023-05-25 | Stop reason: HOSPADM

## 2023-05-25 RX ORDER — ACETAMINOPHEN 500 MG
1000 TABLET ORAL EVERY 6 HOURS PRN
COMMUNITY

## 2023-05-25 RX ORDER — LEVOFLOXACIN 5 MG/ML
750 INJECTION, SOLUTION INTRAVENOUS ONCE
Status: DISCONTINUED | OUTPATIENT
Start: 2023-05-25 | End: 2023-05-25

## 2023-05-25 RX ORDER — LEVOFLOXACIN 5 MG/ML
500 INJECTION, SOLUTION INTRAVENOUS ONCE
Status: COMPLETED | OUTPATIENT
Start: 2023-05-25 | End: 2023-05-25

## 2023-05-25 RX ORDER — DEXTROSE MONOHYDRATE 100 MG/ML
INJECTION, SOLUTION INTRAVENOUS CONTINUOUS PRN
Status: DISCONTINUED | OUTPATIENT
Start: 2023-05-25 | End: 2023-05-25 | Stop reason: HOSPADM

## 2023-05-25 RX ORDER — HYDROMORPHONE HYDROCHLORIDE 1 MG/ML
0.5 INJECTION, SOLUTION INTRAMUSCULAR; INTRAVENOUS; SUBCUTANEOUS EVERY 5 MIN PRN
Status: DISCONTINUED | OUTPATIENT
Start: 2023-05-25 | End: 2023-05-25 | Stop reason: HOSPADM

## 2023-05-25 RX ORDER — SODIUM CHLORIDE 0.9 % (FLUSH) 0.9 %
5-40 SYRINGE (ML) INJECTION PRN
Status: DISCONTINUED | OUTPATIENT
Start: 2023-05-25 | End: 2023-05-25 | Stop reason: HOSPADM

## 2023-05-25 RX ORDER — IPRATROPIUM BROMIDE AND ALBUTEROL SULFATE 2.5; .5 MG/3ML; MG/3ML
1 SOLUTION RESPIRATORY (INHALATION)
Status: DISCONTINUED | OUTPATIENT
Start: 2023-05-25 | End: 2023-05-25 | Stop reason: HOSPADM

## 2023-05-25 RX ORDER — SODIUM CHLORIDE 9 MG/ML
INJECTION, SOLUTION INTRAVENOUS PRN
Status: DISCONTINUED | OUTPATIENT
Start: 2023-05-25 | End: 2023-05-25 | Stop reason: HOSPADM

## 2023-05-25 RX ORDER — SODIUM CHLORIDE, SODIUM LACTATE, POTASSIUM CHLORIDE, CALCIUM CHLORIDE 600; 310; 30; 20 MG/100ML; MG/100ML; MG/100ML; MG/100ML
INJECTION, SOLUTION INTRAVENOUS CONTINUOUS
Status: DISCONTINUED | OUTPATIENT
Start: 2023-05-25 | End: 2023-05-25 | Stop reason: HOSPADM

## 2023-05-25 RX ORDER — SODIUM CHLORIDE 0.9 % (FLUSH) 0.9 %
5-40 SYRINGE (ML) INJECTION EVERY 12 HOURS SCHEDULED
Status: DISCONTINUED | OUTPATIENT
Start: 2023-05-25 | End: 2023-05-25 | Stop reason: HOSPADM

## 2023-05-25 RX ORDER — ONDANSETRON 2 MG/ML
INJECTION INTRAMUSCULAR; INTRAVENOUS PRN
Status: DISCONTINUED | OUTPATIENT
Start: 2023-05-25 | End: 2023-05-25 | Stop reason: SDUPTHER

## 2023-05-25 RX ADMIN — SODIUM CHLORIDE, POTASSIUM CHLORIDE, SODIUM LACTATE AND CALCIUM CHLORIDE: 600; 310; 30; 20 INJECTION, SOLUTION INTRAVENOUS at 11:09

## 2023-05-25 RX ADMIN — FENTANYL CITRATE 100 MCG: 50 INJECTION, SOLUTION INTRAMUSCULAR; INTRAVENOUS at 12:21

## 2023-05-25 RX ADMIN — LIDOCAINE HYDROCHLORIDE 100 MG: 20 INJECTION, SOLUTION EPIDURAL; INFILTRATION; INTRACAUDAL; PERINEURAL at 12:24

## 2023-05-25 RX ADMIN — ONDANSETRON HYDROCHLORIDE 4 MG: 2 INJECTION INTRAMUSCULAR; INTRAVENOUS at 12:42

## 2023-05-25 RX ADMIN — SODIUM CHLORIDE, POTASSIUM CHLORIDE, SODIUM LACTATE AND CALCIUM CHLORIDE: 600; 310; 30; 20 INJECTION, SOLUTION INTRAVENOUS at 13:52

## 2023-05-25 RX ADMIN — LEVOFLOXACIN 500 MG: 5 INJECTION, SOLUTION INTRAVENOUS at 12:32

## 2023-05-25 RX ADMIN — MIDAZOLAM 2 MG: 1 INJECTION INTRAMUSCULAR; INTRAVENOUS at 12:18

## 2023-05-25 ASSESSMENT — PAIN SCALES - GENERAL
PAINLEVEL_OUTOF10: 0

## 2023-05-25 ASSESSMENT — LIFESTYLE VARIABLES: SMOKING_STATUS: 0

## 2023-05-25 NOTE — PERIOP NOTE
Discharge instructions reviewed with patient and family. Opportunity for questions and answers given. No further questions at this time.    Camacho teaching done - tolerating po fluids and crackers - will plan for discharge

## 2023-05-25 NOTE — ANESTHESIA POSTPROCEDURE EVALUATION
Post-Anesthesia Evaluation and Assessment    Cardiovascular Function/Vital Signs/Pain Score:  Vitals  BP: 130/88  Temp: 97.6 °F (36.4 °C)  Temp Source: Temporal  Pulse: 78  Respirations: 19  SpO2: 100 %  Height: 5' 8\" (172.7 cm)  Weight - Scale: 276 lb 11.2 oz (125.5 kg)  Pain Level: 0    Patient is status post Procedure(s):  CYSTOSCOPY , BALLOON DILATION OF STRICTURE. Nausea/Vomiting: Controlled. Postoperative hydration reviewed and adequate. Pain:  Managed    Mental Status and Level of Consciousness: Baseline and appropriate for discharge. Adequate oxygenation and airway patent. Complications related to anesthesia: None    Post-anesthesia assessment completed. No concerns. Patient has met all discharge requirements.     Signed By: Colin Aponte MD    May 25, 2023
37.3

## 2023-05-25 NOTE — PERIOP NOTE
TRANSFER - IN REPORT:    Verbal report received from ORN & CRNA on [facility]  being received from OR for routine post-op      Report consisted of patient's Situation, Background, Assessment and   Recommendations(SBAR). Information from the following report(s) Nurse Handoff Report was reviewed with the receiving nurse. Opportunity for questions and clarification was provided. Assessment completed upon patient's arrival to unit and care assumed.

## 2023-05-25 NOTE — PERIOP NOTE
Reviewed PTA medication list with patient/caregiver and patient/caregiver denies any additional medications. Patient admits to having a responsible adult care for them at home for at least 24 hours after surgery. Patient encouraged to use gown warming system and informed that using said warming gown to regulate body temperature prior to a procedure has been shown to help reduce the risks of blood clots and infection. Patient's pharmacy of choice verified and documented in PTA medication section. Dual skin assessment & fall risk band verification completed with MEGHA Bartlett RN.

## 2023-05-25 NOTE — ANESTHESIA PRE PROCEDURE
Department of Anesthesiology  Preprocedure Note       Name:  Salinas Ibarra   Age:  67 y.o.  :  1950                                          MRN:  [id number]         Date:  2023      Surgeon: Rivera Swanson):  Mark Link MD    Procedure: Procedure(s):  CYSTOSCOPY , BALLOON DILATION OF STRICTURE AND POSSIBLE SUPRAPUBIC TUBE PLACEMENT WITH C-ARM \"SPEC POP\"    Medications prior to admission:   Prior to Admission medications    Medication Sig Start Date End Date Taking? Authorizing Provider   acetaminophen (TYLENOL) 500 MG tablet Take 2 tablets by mouth every 6 hours as needed for Pain   Yes Historical Provider, MD   insulin 70-30 (HUMULIN;NOVOLIN) (70-30) 100 UNIT per ML injection vial Inject 60 Units into the skin daily After dinner    Historical Provider, MD   atorvastatin (LIPITOR) 40 MG tablet Take 1 tablet by mouth nightly    Ar Automatic Reconciliation   carvedilol (COREG) 25 MG tablet Take 1 tablet by mouth 2 times daily (with meals)    Ar Automatic Reconciliation   Cetirizine HCl 10 MG CAPS Take 10 mg by mouth nightly    Ar Automatic Reconciliation   furosemide (LASIX) 40 MG tablet Take by mouth daily    Ar Automatic Reconciliation   gabapentin (NEURONTIN) 300 MG capsule Take 1 capsule by mouth 3 times daily.     Ar Automatic Reconciliation   insulin 70-30 (HUMULIN;NOVOLIN) (70-30) 100 UNIT per ML injection vial Inject 70 Units into the skin every morning    Ar Automatic Reconciliation   ipratropium (ATROVENT) 0.03 % nasal spray 2 sprays 3 times daily    Ar Automatic Reconciliation   losartan (COZAAR) 50 MG tablet Take 1 tablet by mouth daily    Ar Automatic Reconciliation   metFORMIN (GLUCOPHAGE-XR) 750 MG extended release tablet Take 2 tablets by mouth Daily with supper    Ar Automatic Reconciliation   montelukast (SINGULAIR) 10 MG tablet Take 1 tablet by mouth nightly    Ar Automatic Reconciliation   spironolactone (ALDACTONE) 25 MG tablet Take 1 tablet by mouth daily    Ar Automatic Reconciliation

## 2023-05-25 NOTE — PERIOP NOTE
TRANSFER - OUT REPORT:    Verbal report given to Ana Rico RN on [employer]  being transferred to Phase 2 for routine post-op       Report consisted of patient's Situation, Background, Assessment and   Recommendations(SBAR). Information from the following report(s) Nurse Handoff Report was reviewed with the receiving nurse. [place] Assessment: No data recorded  Lines:   Peripheral IV 05/25/23 Left;Posterior Hand (Active)   Site Assessment Clean, dry & intact 05/25/23 1321   Line Status Infusing 05/25/23 1321   Phlebitis Assessment No symptoms 05/25/23 1321   Infiltration Assessment 0 05/25/23 1321   Alcohol Cap Used No 05/25/23 1321   Dressing Status Clean, dry & intact 05/25/23 1321   Dressing Type Transparent 05/25/23 1321        Opportunity for questions and clarification was provided.       Patient transported with:  [employer]

## 2023-05-25 NOTE — PERIOP NOTE
TRANSFER - IN REPORT:    Verbal report received from L. [id number] [facility] RN on [employer]  being received from PACU for routine post-op      Report consisted of patient's Situation, Background, Assessment and   Recommendations(SBAR). Information from the following report(s) Nurse Handoff Report, Adult Overview, Surgery Report, Intake/Output, and MAR was reviewed with the receiving nurse. Opportunity for questions and clarification was provided. Assessment completed upon patient's arrival to unit and care assumed.

## 2023-05-25 NOTE — PERIOP NOTE
Notified Dr. Eduardo Foster of current post op blood sugar 73 no treatment ordered patient asymptomatic.

## 2023-05-25 NOTE — DISCHARGE INSTRUCTIONS
Darcie Bence. Josh Florian M.D. [facility]  [address], [address] [address], [address] [facility]  Office: [phone]  Fax:    [phone]    PROCEDURE: Procedure(s):  CYSTOSCOPY , BALLOON DILATION OF STRICTURE AND POSSIBLE SUPRAPUBIC TUBE PLACEMENT WITH C-ARM \"SPEC POP\"    Notify [facility] IMMEDIATELY if any of the following occur: You are unable to urinate. Urgency to urinate is not uncommon. You find yourself urinating small frequent amounts associated with severe lower abdominal discomfort. Bright red blood with clots in the urine. Some reddish urine is not uncommon and should be treated with increasing the amount of fluids you drink. Temperature above 101.5° and / or chills. You are nauseous and / or vomiting and you cannot hold down any fluids. Your pain is not controlled with the pain medication prescribed. Special Considerations:     Do not drive for at least 24 hours after the procedure and until you are no longer taking narcotic pain medication and you are able to move and react without hesitation. MEDICATIONS:  Pain   []  Norco®   []  Percocet®  [] Dilaudid®    [x]  Tramadol  [] Ketorolac   Antibiotics   []  Cipro   []  Keflex    [] Levaquin   []  Bactrim DS®      [x] Cefuroxime   Urination   []  Vesicare®   []  Flomax      Burning   []  Pyridium®   []  UribelTM      Nausea   []  Zofran®   []  Phenergan®      Miscellaneous   []            [] Prescriptions Written on Chart    [x] Prescriptions sent Electronically     Our office will call you tomorrow to schedule your first follow-up appointment. Please contact [place] [facility] [address] [facility] at [phone] or go to the nearest Emergency Department / Urgent Care facility for any other medical questions or concerns.          DISCHARGE SUMMARY from Nurse    PATIENT INSTRUCTIONS:    After general anesthesia or intravenous sedation, for 24 hours or while taking prescription Narcotics:  Limit your activities  Do not drive and operate hazardous

## 2023-05-25 NOTE — PERIOP NOTE
Notified Dr Ledy Moran patient has been off coumadin since 4/11/23. Also notified that patient had coreg last 5/24/23 1800. No orders given. Okay to proceed.

## 2023-05-25 NOTE — INTERVAL H&P NOTE
Update History & Physical    The patient's History and Physical of April 28, 2023 was reviewed with the patient and I examined the patient. There was no change. The surgical site was confirmed by the patient and me. Plan: The risks, benefits, expected outcome, and alternative to the recommended procedure have been discussed with the patient. Patient understands and wants to proceed with the procedure.      Electronically signed by Christiano Arguelles MD on 5/25/2023 at 12:04 PM

## 2025-08-12 ENCOUNTER — HOSPITAL ENCOUNTER (OUTPATIENT)
Facility: HOSPITAL | Age: 75
Setting detail: SPECIMEN
Discharge: HOME | End: 2025-08-15

## 2025-08-12 LAB — LABCORP SPECIMEN COLLECTION: NORMAL

## 2025-08-12 PROCEDURE — 99001 SPECIMEN HANDLING PT-LAB: CPT

## (undated) DEVICE — CATHETER F BLLN 5CC 14FR 2 W HYDRGEL COAT LESS TRAUM LUB

## (undated) DEVICE — SOLUTION IRRIG 1500ML STRL H2O AQUALITE PLAS POUR BTL

## (undated) DEVICE — BANDAGE,GAUZE,CONFORMING,4"X75",STRL,LF: Brand: MEDLINE

## (undated) DEVICE — CATHETER NEPHSTMY 7FR L55CM HI PRSS BLLN 30FR L12CM SHTH

## (undated) DEVICE — Device

## (undated) DEVICE — SOL IRRIGATION INJ NACL 0.9% 500ML BTL

## (undated) DEVICE — BAG  LEG  EX-TUBING  18IN  MEDIUM  20OZ

## (undated) DEVICE — DRESSING,GAUZE,XEROFORM,CURAD,1"X8",ST: Brand: CURAD

## (undated) DEVICE — INFLATION DEVICE: Brand: ENCORE 26

## (undated) DEVICE — CATHETER URETH 22FR BLLN 5CC SIL HYDRGEL 2 W F SPEC CARS M

## (undated) DEVICE — SPONGE GZ W4XL4IN COT 12 PLY TYP VII WVN C FLD DSGN

## (undated) DEVICE — BLADE SAW SAG 73X12.5X1.27 MM FOR LG BNE 2108 SER

## (undated) DEVICE — PREP SKN CHLRAPRP APL 26ML STR --

## (undated) DEVICE — CATHETER URET 5FR L70CM OPN END SGL LUMN INJ HUB FLEXIMA

## (undated) DEVICE — INTENDED FOR TISSUE SEPARATION, AND OTHER PROCEDURES THAT REQUIRE A SHARP SURGICAL BLADE TO PUNCTURE OR CUT.: Brand: BARD-PARKER ® CARBON RIB-BACK BLADES

## (undated) DEVICE — DRAINBAG,ANTI-REFLUX TOWER,L/F,2000ML,LL: Brand: MEDLINE

## (undated) DEVICE — GARMENT,MEDLINE,DVT,INT,CALF,MED, GEN2: Brand: MEDLINE

## (undated) DEVICE — ADAPTER MON OD15X22MM ID15MM STD LUER FIT W/ LIFESAVER

## (undated) DEVICE — BANDAGE,ELASTIC,ESMARK,STERILE,4"X9',LF: Brand: MEDLINE

## (undated) DEVICE — SOLUTION IRRIG 3000ML 0.9% SOD CHL FLX CONT 0797208] ICU MEDICAL INC]

## (undated) DEVICE — GUIDEWIRE UROLOGICAL STR 3 CM 0.038 INX150 CM DUAL-FLEX

## (undated) DEVICE — HANDPIECE SET WITH HIGH FLOW TIP AND SUCTION TUBE: Brand: INTERPULSE

## (undated) DEVICE — TOWEL,OR,DSP,ST,BLUE,STD,4/PK,20PK/CS: Brand: MEDLINE

## (undated) DEVICE — PACK PROCEDURE SURG EXTREMITY CUST

## (undated) DEVICE — SURGIFOAM SPNG SZ 100

## (undated) DEVICE — ZIMMER® STERILE DISPOSABLE TOURNIQUET CUFF WITH PROTECTIVE SLEEVE AND PLC, SINGLE PORT, SINGLE BLADDER, 18 IN. (46 CM)

## (undated) DEVICE — AGENT HEMSTAT 1GM PORCINE GEL ABSRB PWD FOR CONT OOZING

## (undated) DEVICE — CYSTO PACK: Brand: MEDLINE INDUSTRIES, INC.

## (undated) DEVICE — BANDAGE COMPR W3INXL5YD BGE POLY COT E RECOVERABLE BRTH W/

## (undated) DEVICE — BALLOON DILATATION CATHETER: Brand: UROMAX ULTRA

## (undated) DEVICE — SOLUTION LACTATED RINGERS INJECTION USP

## (undated) DEVICE — SNAPSECURE FOLEY DEVICE: Brand: MEDLINE

## (undated) DEVICE — CATHETER URETH BLLN 5CC 20FR F 2 W SPEC COUNCL MOD SHT OPN

## (undated) DEVICE — GLOVE ORTHO 8   MSG9480